# Patient Record
Sex: MALE | Race: WHITE | NOT HISPANIC OR LATINO | Employment: OTHER | URBAN - METROPOLITAN AREA
[De-identification: names, ages, dates, MRNs, and addresses within clinical notes are randomized per-mention and may not be internally consistent; named-entity substitution may affect disease eponyms.]

---

## 2023-11-06 ENCOUNTER — HOSPITAL ENCOUNTER (INPATIENT)
Facility: HOSPITAL | Age: 88
LOS: 6 days | Discharge: NURSING, REHAB OR LONG-TERM CARE | DRG: 689 | End: 2023-11-12
Attending: EMERGENCY MEDICINE | Admitting: STUDENT IN AN ORGANIZED HEALTH CARE EDUCATION/TRAINING PROGRAM
Payer: MEDICARE

## 2023-11-06 ENCOUNTER — APPOINTMENT (OUTPATIENT)
Dept: RADIOLOGY | Facility: HOSPITAL | Age: 88
DRG: 689 | End: 2023-11-06
Payer: MEDICARE

## 2023-11-06 ENCOUNTER — APPOINTMENT (OUTPATIENT)
Dept: CARDIOLOGY | Facility: HOSPITAL | Age: 88
DRG: 689 | End: 2023-11-06
Payer: MEDICARE

## 2023-11-06 DIAGNOSIS — J18.9 PNEUMONIA OF LEFT LUNG DUE TO INFECTIOUS ORGANISM, UNSPECIFIED PART OF LUNG: Primary | ICD-10-CM

## 2023-11-06 DIAGNOSIS — N18.9 ACUTE RENAL FAILURE SUPERIMPOSED ON CHRONIC KIDNEY DISEASE, UNSPECIFIED ACUTE RENAL FAILURE TYPE, UNSPECIFIED CKD STAGE (CMS-HCC): ICD-10-CM

## 2023-11-06 DIAGNOSIS — N17.9 ACUTE RENAL FAILURE SUPERIMPOSED ON CHRONIC KIDNEY DISEASE, UNSPECIFIED ACUTE RENAL FAILURE TYPE, UNSPECIFIED CKD STAGE (CMS-HCC): ICD-10-CM

## 2023-11-06 DIAGNOSIS — M10.372 ACUTE GOUT DUE TO RENAL IMPAIRMENT INVOLVING LEFT ANKLE: ICD-10-CM

## 2023-11-06 DIAGNOSIS — N39.0 ACUTE UTI: ICD-10-CM

## 2023-11-06 LAB
ALBUMIN SERPL BCP-MCNC: 4.1 G/DL (ref 3.4–5)
ALP SERPL-CCNC: 90 U/L (ref 33–136)
ALT SERPL W P-5'-P-CCNC: 23 U/L (ref 10–52)
ANION GAP SERPL CALC-SCNC: 20 MMOL/L (ref 10–20)
APPEARANCE UR: ABNORMAL
AST SERPL W P-5'-P-CCNC: 29 U/L (ref 9–39)
BASOPHILS # BLD MANUAL: 0 X10*3/UL (ref 0–0.1)
BASOPHILS NFR BLD MANUAL: 0 %
BILIRUB SERPL-MCNC: 2 MG/DL (ref 0–1.2)
BILIRUB UR STRIP.AUTO-MCNC: NEGATIVE MG/DL
BNP SERPL-MCNC: 148 PG/ML (ref 0–99)
BUN SERPL-MCNC: 48 MG/DL (ref 6–23)
CALCIUM SERPL-MCNC: 9.9 MG/DL (ref 8.6–10.3)
CARDIAC TROPONIN I PNL SERPL HS: 15 NG/L (ref 0–20)
CHLORIDE SERPL-SCNC: 99 MMOL/L (ref 98–107)
CO2 SERPL-SCNC: 22 MMOL/L (ref 21–32)
COLOR UR: YELLOW
CREAT SERPL-MCNC: 2.23 MG/DL (ref 0.5–1.3)
EOSINOPHIL # BLD MANUAL: 0 X10*3/UL (ref 0–0.4)
EOSINOPHIL NFR BLD MANUAL: 0 %
ERYTHROCYTE [DISTWIDTH] IN BLOOD BY AUTOMATED COUNT: 13.8 % (ref 11.5–14.5)
GFR SERPL CREATININE-BSD FRML MDRD: 28 ML/MIN/1.73M*2
GLUCOSE SERPL-MCNC: 122 MG/DL (ref 74–99)
GLUCOSE UR STRIP.AUTO-MCNC: ABNORMAL MG/DL
HCT VFR BLD AUTO: 40.4 % (ref 41–52)
HGB BLD-MCNC: 13 G/DL (ref 13.5–17.5)
HOLD SPECIMEN: NORMAL
HYALINE CASTS #/AREA URNS AUTO: ABNORMAL /LPF
IMM GRANULOCYTES # BLD AUTO: 0.04 X10*3/UL (ref 0–0.5)
IMM GRANULOCYTES NFR BLD AUTO: 0.4 % (ref 0–0.9)
KETONES UR STRIP.AUTO-MCNC: ABNORMAL MG/DL
LACTATE SERPL-SCNC: 2 MMOL/L (ref 0.4–2)
LEUKOCYTE ESTERASE UR QL STRIP.AUTO: ABNORMAL
LYMPHOCYTES # BLD MANUAL: 2.42 X10*3/UL (ref 0.8–3)
LYMPHOCYTES NFR BLD MANUAL: 22 %
MCH RBC QN AUTO: 31.9 PG (ref 26–34)
MCHC RBC AUTO-ENTMCNC: 32.2 G/DL (ref 32–36)
MCV RBC AUTO: 99 FL (ref 80–100)
MONOCYTES # BLD MANUAL: 1.1 X10*3/UL (ref 0.05–0.8)
MONOCYTES NFR BLD MANUAL: 10 %
MUCOUS THREADS #/AREA URNS AUTO: ABNORMAL /LPF
NEUTROPHILS # BLD MANUAL: 7.48 X10*3/UL (ref 1.6–5.5)
NEUTS BAND # BLD MANUAL: 0.88 X10*3/UL (ref 0–0.5)
NEUTS BAND NFR BLD MANUAL: 8 %
NEUTS SEG # BLD MANUAL: 6.6 X10*3/UL (ref 1.6–5)
NEUTS SEG NFR BLD MANUAL: 60 %
NITRITE UR QL STRIP.AUTO: NEGATIVE
NRBC BLD-RTO: 0 /100 WBCS (ref 0–0)
PH UR STRIP.AUTO: 5 [PH]
PLATELET # BLD AUTO: 272 X10*3/UL (ref 150–450)
POTASSIUM SERPL-SCNC: 4 MMOL/L (ref 3.5–5.3)
PROT SERPL-MCNC: 8.3 G/DL (ref 6.4–8.2)
PROT UR STRIP.AUTO-MCNC: ABNORMAL MG/DL
RBC # BLD AUTO: 4.07 X10*6/UL (ref 4.5–5.9)
RBC # UR STRIP.AUTO: ABNORMAL /UL
RBC #/AREA URNS AUTO: ABNORMAL /HPF
RBC MORPH BLD: ABNORMAL
SARS-COV-2 RNA RESP QL NAA+PROBE: NOT DETECTED
SODIUM SERPL-SCNC: 137 MMOL/L (ref 136–145)
SP GR UR STRIP.AUTO: 1.02
SQUAMOUS #/AREA URNS AUTO: ABNORMAL /HPF
TOTAL CELLS COUNTED BLD: 100
URATE SERPL-MCNC: 11.1 MG/DL (ref 4–7.5)
UROBILINOGEN UR STRIP.AUTO-MCNC: 4 MG/DL
WBC # BLD AUTO: 11 X10*3/UL (ref 4.4–11.3)
WBC #/AREA URNS AUTO: ABNORMAL /HPF

## 2023-11-06 PROCEDURE — 2500000001 HC RX 250 WO HCPCS SELF ADMINISTERED DRUGS (ALT 637 FOR MEDICARE OP): Performed by: STUDENT IN AN ORGANIZED HEALTH CARE EDUCATION/TRAINING PROGRAM

## 2023-11-06 PROCEDURE — 1210000001 HC SEMI-PRIVATE ROOM DAILY

## 2023-11-06 PROCEDURE — 87086 URINE CULTURE/COLONY COUNT: CPT | Mod: ELYLAB | Performed by: EMERGENCY MEDICINE

## 2023-11-06 PROCEDURE — 85027 COMPLETE CBC AUTOMATED: CPT | Performed by: EMERGENCY MEDICINE

## 2023-11-06 PROCEDURE — 81001 URINALYSIS AUTO W/SCOPE: CPT | Performed by: EMERGENCY MEDICINE

## 2023-11-06 PROCEDURE — 83880 ASSAY OF NATRIURETIC PEPTIDE: CPT | Performed by: STUDENT IN AN ORGANIZED HEALTH CARE EDUCATION/TRAINING PROGRAM

## 2023-11-06 PROCEDURE — 96365 THER/PROPH/DIAG IV INF INIT: CPT

## 2023-11-06 PROCEDURE — 71045 X-RAY EXAM CHEST 1 VIEW: CPT | Performed by: RADIOLOGY

## 2023-11-06 PROCEDURE — 87075 CULTR BACTERIA EXCEPT BLOOD: CPT | Mod: ELYLAB | Performed by: EMERGENCY MEDICINE

## 2023-11-06 PROCEDURE — 99285 EMERGENCY DEPT VISIT HI MDM: CPT | Mod: 25 | Performed by: EMERGENCY MEDICINE

## 2023-11-06 PROCEDURE — 71045 X-RAY EXAM CHEST 1 VIEW: CPT | Mod: FY

## 2023-11-06 PROCEDURE — 80053 COMPREHEN METABOLIC PANEL: CPT | Performed by: EMERGENCY MEDICINE

## 2023-11-06 PROCEDURE — 93005 ELECTROCARDIOGRAM TRACING: CPT

## 2023-11-06 PROCEDURE — 87040 BLOOD CULTURE FOR BACTERIA: CPT | Mod: ELYLAB | Performed by: EMERGENCY MEDICINE

## 2023-11-06 PROCEDURE — 36415 COLL VENOUS BLD VENIPUNCTURE: CPT | Performed by: EMERGENCY MEDICINE

## 2023-11-06 PROCEDURE — 84550 ASSAY OF BLOOD/URIC ACID: CPT | Performed by: EMERGENCY MEDICINE

## 2023-11-06 PROCEDURE — 83605 ASSAY OF LACTIC ACID: CPT | Performed by: EMERGENCY MEDICINE

## 2023-11-06 PROCEDURE — 36415 COLL VENOUS BLD VENIPUNCTURE: CPT | Mod: ELYLAB | Performed by: EMERGENCY MEDICINE

## 2023-11-06 PROCEDURE — 87635 SARS-COV-2 COVID-19 AMP PRB: CPT | Performed by: EMERGENCY MEDICINE

## 2023-11-06 PROCEDURE — 85007 BL SMEAR W/DIFF WBC COUNT: CPT | Performed by: EMERGENCY MEDICINE

## 2023-11-06 PROCEDURE — 2500000004 HC RX 250 GENERAL PHARMACY W/ HCPCS (ALT 636 FOR OP/ED): Performed by: EMERGENCY MEDICINE

## 2023-11-06 PROCEDURE — 84484 ASSAY OF TROPONIN QUANT: CPT | Performed by: EMERGENCY MEDICINE

## 2023-11-06 PROCEDURE — 76770 US EXAM ABDO BACK WALL COMP: CPT | Performed by: RADIOLOGY

## 2023-11-06 PROCEDURE — 2500000004 HC RX 250 GENERAL PHARMACY W/ HCPCS (ALT 636 FOR OP/ED): Performed by: STUDENT IN AN ORGANIZED HEALTH CARE EDUCATION/TRAINING PROGRAM

## 2023-11-06 PROCEDURE — 99223 1ST HOSP IP/OBS HIGH 75: CPT | Performed by: STUDENT IN AN ORGANIZED HEALTH CARE EDUCATION/TRAINING PROGRAM

## 2023-11-06 PROCEDURE — 76770 US EXAM ABDO BACK WALL COMP: CPT

## 2023-11-06 PROCEDURE — 2500000001 HC RX 250 WO HCPCS SELF ADMINISTERED DRUGS (ALT 637 FOR MEDICARE OP): Performed by: EMERGENCY MEDICINE

## 2023-11-06 RX ORDER — NITROGLYCERIN 0.4 MG/1
0.4 TABLET SUBLINGUAL EVERY 5 MIN PRN
Status: DISCONTINUED | OUTPATIENT
Start: 2023-11-06 | End: 2023-11-12 | Stop reason: HOSPADM

## 2023-11-06 RX ORDER — CHOLECALCIFEROL (VITAMIN D3) 25 MCG
1 TABLET ORAL DAILY
COMMUNITY
Start: 2020-01-17

## 2023-11-06 RX ORDER — SODIUM CHLORIDE, SODIUM LACTATE, POTASSIUM CHLORIDE, CALCIUM CHLORIDE 600; 310; 30; 20 MG/100ML; MG/100ML; MG/100ML; MG/100ML
75 INJECTION, SOLUTION INTRAVENOUS CONTINUOUS
Status: DISCONTINUED | OUTPATIENT
Start: 2023-11-06 | End: 2023-11-08

## 2023-11-06 RX ORDER — SPIRONOLACTONE 25 MG/1
25 TABLET ORAL DAILY
Status: DISCONTINUED | OUTPATIENT
Start: 2023-11-06 | End: 2023-11-12 | Stop reason: HOSPADM

## 2023-11-06 RX ORDER — COLCHICINE 0.6 MG/1
0.6 TABLET ORAL ONCE
Status: COMPLETED | OUTPATIENT
Start: 2023-11-06 | End: 2023-11-06

## 2023-11-06 RX ORDER — LOSARTAN POTASSIUM 25 MG/1
12.5 TABLET ORAL DAILY
Status: DISCONTINUED | OUTPATIENT
Start: 2023-11-06 | End: 2023-11-12 | Stop reason: HOSPADM

## 2023-11-06 RX ORDER — ATORVASTATIN CALCIUM 20 MG/1
40 TABLET, FILM COATED ORAL NIGHTLY
Status: DISCONTINUED | OUTPATIENT
Start: 2023-11-06 | End: 2023-11-12 | Stop reason: HOSPADM

## 2023-11-06 RX ORDER — CEFTRIAXONE 1 G/50ML
1 INJECTION, SOLUTION INTRAVENOUS ONCE
Status: COMPLETED | OUTPATIENT
Start: 2023-11-06 | End: 2023-11-06

## 2023-11-06 RX ORDER — SODIUM CHLORIDE 9 MG/ML
75 INJECTION, SOLUTION INTRAVENOUS CONTINUOUS
Status: DISCONTINUED | OUTPATIENT
Start: 2023-11-06 | End: 2023-11-06

## 2023-11-06 RX ORDER — NITROGLYCERIN 0.4 MG/1
0.4 TABLET SUBLINGUAL EVERY 5 MIN PRN
COMMUNITY

## 2023-11-06 RX ORDER — LEVOTHYROXINE SODIUM 75 UG/1
75 TABLET ORAL
COMMUNITY
Start: 2020-01-17

## 2023-11-06 RX ORDER — SENNOSIDES 8.6 MG/1
2 TABLET ORAL 2 TIMES DAILY
Status: DISCONTINUED | OUTPATIENT
Start: 2023-11-06 | End: 2023-11-12 | Stop reason: HOSPADM

## 2023-11-06 RX ORDER — METOPROLOL SUCCINATE 50 MG/1
50 TABLET, EXTENDED RELEASE ORAL DAILY
Status: DISCONTINUED | OUTPATIENT
Start: 2023-11-06 | End: 2023-11-12 | Stop reason: HOSPADM

## 2023-11-06 RX ORDER — PREDNISONE 20 MG/1
20 TABLET ORAL DAILY
Status: DISCONTINUED | OUTPATIENT
Start: 2023-11-11 | End: 2023-11-09

## 2023-11-06 RX ORDER — LOSARTAN POTASSIUM 25 MG/1
0.5 TABLET ORAL DAILY
COMMUNITY
Start: 2023-03-22

## 2023-11-06 RX ORDER — FUROSEMIDE 40 MG/1
40 TABLET ORAL DAILY PRN
Status: DISCONTINUED | OUTPATIENT
Start: 2023-11-06 | End: 2023-11-12 | Stop reason: HOSPADM

## 2023-11-06 RX ORDER — PREDNISONE 10 MG/1
10 TABLET ORAL DAILY
Status: DISCONTINUED | OUTPATIENT
Start: 2023-11-14 | End: 2023-11-09

## 2023-11-06 RX ORDER — TRAMADOL HYDROCHLORIDE 50 MG/1
50 TABLET ORAL EVERY 12 HOURS PRN
Status: DISCONTINUED | OUTPATIENT
Start: 2023-11-06 | End: 2023-11-12 | Stop reason: HOSPADM

## 2023-11-06 RX ORDER — PREDNISONE 10 MG/1
5 TABLET ORAL DAILY
Status: DISCONTINUED | OUTPATIENT
Start: 2023-11-17 | End: 2023-11-09

## 2023-11-06 RX ORDER — METOPROLOL SUCCINATE 50 MG/1
50 TABLET, EXTENDED RELEASE ORAL DAILY
COMMUNITY
Start: 2023-03-22

## 2023-11-06 RX ORDER — GUAIFENESIN 600 MG/1
600 TABLET, EXTENDED RELEASE ORAL 2 TIMES DAILY PRN
Status: DISCONTINUED | OUTPATIENT
Start: 2023-11-06 | End: 2023-11-12 | Stop reason: HOSPADM

## 2023-11-06 RX ORDER — CEFTRIAXONE 1 G/50ML
1 INJECTION, SOLUTION INTRAVENOUS EVERY 24 HOURS
Status: DISCONTINUED | OUTPATIENT
Start: 2023-11-07 | End: 2023-11-08

## 2023-11-06 RX ORDER — LEVOTHYROXINE SODIUM 75 UG/1
75 TABLET ORAL
Status: DISCONTINUED | OUTPATIENT
Start: 2023-11-07 | End: 2023-11-12 | Stop reason: HOSPADM

## 2023-11-06 RX ORDER — ATORVASTATIN CALCIUM 40 MG/1
40 TABLET, FILM COATED ORAL NIGHTLY
COMMUNITY
Start: 2020-01-17

## 2023-11-06 RX ORDER — PREDNISONE 20 MG/1
40 TABLET ORAL DAILY
Status: DISCONTINUED | OUTPATIENT
Start: 2023-11-06 | End: 2023-11-09

## 2023-11-06 RX ORDER — SPIRONOLACTONE 25 MG/1
25 TABLET ORAL DAILY
COMMUNITY
Start: 2023-05-17

## 2023-11-06 RX ORDER — FUROSEMIDE 40 MG/1
40 TABLET ORAL DAILY PRN
COMMUNITY
Start: 2023-10-02

## 2023-11-06 RX ADMIN — METOPROLOL SUCCINATE 50 MG: 50 TABLET, EXTENDED RELEASE ORAL at 21:55

## 2023-11-06 RX ADMIN — PREDNISONE 40 MG: 20 TABLET ORAL at 18:49

## 2023-11-06 RX ADMIN — SODIUM CHLORIDE, POTASSIUM CHLORIDE, SODIUM LACTATE AND CALCIUM CHLORIDE 75 ML/HR: 600; 310; 30; 20 INJECTION, SOLUTION INTRAVENOUS at 18:49

## 2023-11-06 RX ADMIN — APIXABAN 2.5 MG: 5 TABLET, FILM COATED ORAL at 18:50

## 2023-11-06 RX ADMIN — TRAMADOL HYDROCHLORIDE 50 MG: 50 TABLET, COATED ORAL at 21:57

## 2023-11-06 RX ADMIN — CEFTRIAXONE SODIUM 1 G: 1 INJECTION, SOLUTION INTRAVENOUS at 12:54

## 2023-11-06 RX ADMIN — STANDARDIZED SENNA CONCENTRATE 17.2 MG: 8.6 TABLET ORAL at 21:55

## 2023-11-06 RX ADMIN — EMPAGLIFLOZIN 12.5 MG: 25 TABLET, FILM COATED ORAL at 18:50

## 2023-11-06 RX ADMIN — ATORVASTATIN CALCIUM 40 MG: 20 TABLET, FILM COATED ORAL at 21:55

## 2023-11-06 RX ADMIN — AZITHROMYCIN MONOHYDRATE 500 MG: 500 INJECTION, POWDER, LYOPHILIZED, FOR SOLUTION INTRAVENOUS at 21:55

## 2023-11-06 RX ADMIN — COLCHICINE 0.6 MG: 0.6 TABLET ORAL at 13:22

## 2023-11-06 RX ADMIN — SODIUM CHLORIDE 75 ML/HR: 9 INJECTION, SOLUTION INTRAVENOUS at 13:00

## 2023-11-06 SDOH — SOCIAL STABILITY: SOCIAL INSECURITY: HAVE YOU HAD THOUGHTS OF HARMING ANYONE ELSE?: NO

## 2023-11-06 SDOH — SOCIAL STABILITY: SOCIAL INSECURITY: WERE YOU ABLE TO COMPLETE ALL THE BEHAVIORAL HEALTH SCREENINGS?: YES

## 2023-11-06 ASSESSMENT — COGNITIVE AND FUNCTIONAL STATUS - GENERAL
CLIMB 3 TO 5 STEPS WITH RAILING: A LITTLE
DAILY ACTIVITIY SCORE: 24
MOBILITY SCORE: 23
MOBILITY SCORE: 23
DAILY ACTIVITIY SCORE: 24
PATIENT BASELINE BEDBOUND: NO
CLIMB 3 TO 5 STEPS WITH RAILING: A LITTLE

## 2023-11-06 ASSESSMENT — ACTIVITIES OF DAILY LIVING (ADL)
GROOMING: INDEPENDENT
PATIENT'S MEMORY ADEQUATE TO SAFELY COMPLETE DAILY ACTIVITIES?: YES
BATHING: INDEPENDENT
JUDGMENT_ADEQUATE_SAFELY_COMPLETE_DAILY_ACTIVITIES: YES
HEARING - LEFT EAR: HEARING AID
DRESSING YOURSELF: INDEPENDENT
FEEDING YOURSELF: INDEPENDENT
WALKS IN HOME: INDEPENDENT
TOILETING: INDEPENDENT
HEARING - RIGHT EAR: HEARING AID
LACK_OF_TRANSPORTATION: NO
ADEQUATE_TO_COMPLETE_ADL: YES

## 2023-11-06 ASSESSMENT — PAIN SCALES - GENERAL
PAINLEVEL_OUTOF10: 3
PAINLEVEL_OUTOF10: 0 - NO PAIN
PAINLEVEL_OUTOF10: 8

## 2023-11-06 ASSESSMENT — LIFESTYLE VARIABLES
SKIP TO QUESTIONS 9-10: 1
AUDIT TOTAL SCORE: 0
HAVE PEOPLE ANNOYED YOU BY CRITICIZING YOUR DRINKING: NO
SUBSTANCE_ABUSE_PAST_12_MONTHS: NO
REASON UNABLE TO ASSESS: NO
AUDIT TOTAL SCORE: 3
HOW OFTEN DURING THE LAST YEAR HAVE YOU FAILED TO DO WHAT WAS NORMALLY EXPECTED FROM YOU BECAUSE OF DRINKING: NEVER
EVER HAD A DRINK FIRST THING IN THE MORNING TO STEADY YOUR NERVES TO GET RID OF A HANGOVER: NO
HOW MANY STANDARD DRINKS CONTAINING ALCOHOL DO YOU HAVE ON A TYPICAL DAY: 1 OR 2
HOW OFTEN DO YOU HAVE A DRINK CONTAINING ALCOHOL: 2-3 TIMES A WEEK
AUDIT-C TOTAL SCORE: 3
HOW OFTEN DURING THE LAST YEAR HAVE YOU FOUND THAT YOU WERE NOT ABLE TO STOP DRINKING ONCE YOU HAD STARTED: NEVER
HAVE YOU OR SOMEONE ELSE BEEN INJURED AS A RESULT OF YOUR DRINKING: NO
HOW OFTEN DURING THE LAST YEAR HAVE YOU BEEN UNABLE TO REMEMBER WHAT HAPPENED THE NIGHT BEFORE BECAUSE YOU HAD BEEN DRINKING: NEVER
EVER FELT BAD OR GUILTY ABOUT YOUR DRINKING: NO
AUDIT-C TOTAL SCORE: 3
HOW OFTEN DURING THE LAST YEAR HAVE YOU NEEDED AN ALCOHOLIC DRINK FIRST THING IN THE MORNING TO GET YOURSELF GOING AFTER A NIGHT OF HEAVY DRINKING: NEVER
HOW OFTEN DURING THE LAST YEAR HAVE YOU HAD A FEELING OF GUILT OR REMORSE AFTER DRINKING: NEVER
PRESCIPTION_ABUSE_PAST_12_MONTHS: NO
HAS A RELATIVE, FRIEND, DOCTOR, OR ANOTHER HEALTH PROFESSIONAL EXPRESSED CONCERN ABOUT YOUR DRINKING OR SUGGESTED YOU CUT DOWN: NO
HOW OFTEN DO YOU HAVE 6 OR MORE DRINKS ON ONE OCCASION: NEVER
HAVE YOU EVER FELT YOU SHOULD CUT DOWN ON YOUR DRINKING: NO

## 2023-11-06 ASSESSMENT — PATIENT HEALTH QUESTIONNAIRE - PHQ9
1. LITTLE INTEREST OR PLEASURE IN DOING THINGS: NOT AT ALL
SUM OF ALL RESPONSES TO PHQ9 QUESTIONS 1 & 2: 0
2. FEELING DOWN, DEPRESSED OR HOPELESS: NOT AT ALL

## 2023-11-06 ASSESSMENT — PAIN - FUNCTIONAL ASSESSMENT
PAIN_FUNCTIONAL_ASSESSMENT: 0-10
PAIN_FUNCTIONAL_ASSESSMENT: 0-10

## 2023-11-06 ASSESSMENT — PAIN DESCRIPTION - PROGRESSION: CLINICAL_PROGRESSION: NOT CHANGED

## 2023-11-06 ASSESSMENT — PAIN DESCRIPTION - LOCATION: LOCATION: LEG

## 2023-11-06 ASSESSMENT — COLUMBIA-SUICIDE SEVERITY RATING SCALE - C-SSRS
6. HAVE YOU EVER DONE ANYTHING, STARTED TO DO ANYTHING, OR PREPARED TO DO ANYTHING TO END YOUR LIFE?: NO
2. HAVE YOU ACTUALLY HAD ANY THOUGHTS OF KILLING YOURSELF?: NO
1. IN THE PAST MONTH, HAVE YOU WISHED YOU WERE DEAD OR WISHED YOU COULD GO TO SLEEP AND NOT WAKE UP?: NO

## 2023-11-06 ASSESSMENT — PAIN DESCRIPTION - ORIENTATION: ORIENTATION: LEFT;RIGHT

## 2023-11-06 NOTE — PROGRESS NOTES
Pharmacy Medication History Review    Marcelino Oropeza is a 88 y.o. male admitted for Pneumonia of left lung due to infectious organism, unspecified part of lung. Pharmacy reviewed the patient's ylegf-oe-ycjdsnigc medications and allergies for accuracy.    The list below reflectives the updated PTA list. Please review each medication in order reconciliation for additional clarification and justification.  (Not in a hospital admission)       The list below reflectives the updated allergy list. Please review each documented allergy for additional clarification and justification.  Allergies  Reviewed by Roz Verduzco RN on 11/6/2023   No Known Allergies         Below are additional concerns with the patient's PTA list.  Alerts for Empagliflozin & Losartan - Approval from [facility] & [facility]    Heather Watson CPhT

## 2023-11-06 NOTE — H&P
Medical Group History and Physical    ASSESSMENT & PLAN:     Gram Negative Bacterial Pneumonia  -Patient with productive purulent sputum, hoarse cough, and imaging findings consistent with pneumonia at time of presentation.    Plan:  -We will treat with ceftriaxone/azithromycin and monitor for improvement  -We will send procalcitonin   -Strep urine/Legionella antigen    Acute Gout Flare  -Presenting with b/l LE pain/swelling.  Most significant in left ankle.  Given renal function will treat with prednisone taper and monitor.    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 Unclear etiology. Will send renal lytes for FeUrea/obtain Renal U/s for further evaluation    UTI  -On ATB as per above, will follow cx    HFrEF  -Appears compensated. Most recent echo reviewed, revealed EF of 25-30%. BNP indeterminate and patient does not appear grossly overloaded.  Will monitor    Hx Afib, CAD, HTN, HLD, Arthritis  -Home meds resumed as applicable    DVT Ppx:  -On Jose Norwood MD    HISTORY OF PRESENT ILLNESS:   Chief Complaint: Leg pain    History Of Present Illness:    Marcelino Oropeza is a 88 y.o. male with a significant past medical history of including gout, HFrEF, and A-fib who presents to hospital due to concerns of leg pain.  Patient is a poor historian however reports that he began to experience pain in his lower extremities for the past few days.  Localizes most to his left ankle.  Notes that it is tender with touch and movement.  Reportedly unsteady on his feet, states he felt like his right ankle was about to give out.  Does have a history of gout, unsure if this is like prior flares in the past.  Does report intake of 1-2 vodka drinks at night in recent history.  Denying any fevers chills or chest pain.  Is endorsing cough with discolored sputum production.  States has been ongoing also for the past few days.  Denies difficulty breathing.  Endorsing constipation but denies any  abdominal pain or diarrhea.  Further ROS is unremarkable..     Past Medical History:  - CAD, Ischemic cardiomyopathy, HTN, Systolic heart failure, A-fib, Hyperlipidemia, Gout, arthritis     Past Surgical History:  -CABG x4,    Family History:  -Denies family history including hypertension/diabetes    Social History:  Tobacco: Quit 60 years ago  Alcohol: Drinks 1-2 vodka drinks a night  Other Drugs: Denies use  Home Situation: Lives by himself.  Ambulates with cane.       Review of Systems:  10 point review of systems performed, pertinent positives as per the HPI otherwise negative.     PAST HISTORIES:       Past Medical History:  He has a past medical history of Gout and Hypertension.    Past Surgical History:  He has a past surgical history that includes Other surgical history (04/14/2017); Aortic valve replacement (04/14/2017); Other surgical history (04/14/2017); and Coronary artery bypass graft.      Social History:  He has no history on file for tobacco use, alcohol use, and drug use.    Family History:  No family history on file.     Allergies:  Patient has no known allergies.    OBJECTIVE:       Last Recorded Vitals:  Vitals:    11/06/23 1348 11/06/23 1349 11/06/23 1350 11/06/23 1533   BP:   121/58 136/71   BP Location:    Left arm   Patient Position:    Sitting   Pulse: 88 88  91   Resp:    18   Temp:       SpO2: 98% 98%  98%   Weight:       Height:           Last I/O:  No intake/output data recorded.    Physical Exam  General: Well-developed elderly male in mild distress  HEENT: Clear sclera, EOMI, trachea midline, moist mucous membranes  Respiratory: Equal chest rise, no retractions, diffuse rhonchi  Cardiovascular: S1 and S2 auscultated, no murmurs clicks or rubs  Abdomen: Soft, nontender, nondistended  Extremities: Bilateral lower extremity swelling, nonpitting, left ankle exquisitely tender to touch and ROM  Neurological: Spontaneously moves all extremities, no dysarthria, cranial nerves grossly  intact  Psychiatric: Appropriate mood and affect  Skin: Warm, dry    Scheduled Medications  predniSONE, 40 mg, oral, Daily   Followed by  [START ON 11/11/2023] predniSONE, 20 mg, oral, Daily   Followed by  [START ON 11/14/2023] predniSONE, 10 mg, oral, Daily   Followed by  [START ON 11/17/2023] predniSONE, 5 mg, oral, Daily      PRN Medications    Continuous Medications  sodium chloride 0.9%, 75 mL/hr, Last Rate: 75 mL/hr (11/06/23 1300)        Outpatient Medications:  Prior to Admission medications    Medication Sig Start Date End Date Taking? Authorizing Provider   apixaban (Eliquis) 2.5 mg tablet Take 1 tablet (2.5 mg) by mouth every 12 hours. 8/24/23  Yes Historical Provider, MD   atorvastatin (Lipitor) 40 mg tablet Take 1 tablet (40 mg) by mouth once daily at bedtime. 1/17/20  Yes Historical Provider, MD   cholecalciferol (Vitamin D-3) 25 MCG (1000 UT) tablet Take 1 tablet (25 mcg) by mouth once daily. 1/17/20  Yes Historical Provider, MD   empagliflozin (Jardiance) 25 mg Take 0.5 tablets (12.5 mg) by mouth once daily. 10/2/23  Yes Historical Provider, MD   furosemide (Lasix) 40 mg tablet Take 1 tablet (40 mg) by mouth once daily as needed (Fluid Retention). 10/2/23  Yes Historical Provider, MD   levothyroxine (Synthroid, Levoxyl) 75 mcg tablet Take 1 tablet (75 mcg) by mouth once daily in the morning. Take before meals. 1/17/20  Yes Historical Provider, MD   losartan (Cozaar) 25 mg tablet Take 0.5 tablets (12.5 mg) by mouth once daily. 3/22/23  Yes Historical Provider, MD   metoprolol succinate XL (Toprol-XL) 50 mg 24 hr tablet Take 1 tablet (50 mg) by mouth once daily. 3/22/23  Yes Historical Provider, MD   spironolactone (Aldactone) 25 mg tablet Take 1 tablet (25 mg) by mouth once daily. 5/17/23  Yes Historical Provider, MD   nitroglycerin (Nitrostat) 0.4 mg SL tablet Place 1 tablet (0.4 mg) under the tongue every 5 minutes if needed for chest pain (up to 3 doses).    Historical Provider, MD       LABS AND  IMAGING:     Labs:  Results from last 7 days   Lab Units 11/06/23  1113   WBC AUTO x10*3/uL 11.0   RBC AUTO x10*6/uL 4.07*   HEMOGLOBIN g/dL 13.0*   HEMATOCRIT % 40.4*   MCV fL 99   MCH pg 31.9   MCHC g/dL 32.2   RDW % 13.8   PLATELETS AUTO x10*3/uL 272     Results from last 7 days   Lab Units 11/06/23  1113   SODIUM mmol/L 137   POTASSIUM mmol/L 4.0   CHLORIDE mmol/L 99   CO2 mmol/L 22   BUN mg/dL 48*   CREATININE mg/dL 2.23*   GLUCOSE mg/dL 122*   PROTEIN TOTAL g/dL 8.3*   CALCIUM mg/dL 9.9   BILIRUBIN TOTAL mg/dL 2.0*   ALK PHOS U/L 90   AST U/L 29   ALT U/L 23         Results from last 7 days   Lab Units 11/06/23  1113   TROPHS ng/L 15       Imaging:  XR chest 1 view  Narrative: Interpreted By:  Franck Kirk,   STUDY:  XR CHEST 1 VIEW  11/6/2023 12:39 pm      INDICATION:  Signs/Symptoms:cough      COMPARISON:  07/19/2022      ACCESSION NUMBER(S):  [id number]      ORDERING CLINICIAN:  SUKH HOWELL      TECHNIQUE:  A single AP portable radiograph of the chest was obtained.      FINDINGS:  Multiple cardiac monitoring leads are seen over the chest.  Sternal  wires and mediastinal surgical clips are present. Left basilar  airspace consolidation is seen and may represent small pleural  effusion, atelectasis and/or pneumonia. No pneumothorax is  identified. The cardiac silhouette is within normal limits for size.      Impression: Left-sided airspace consolidation, as above. Clinical correlation and  continued follow-up until clearing is recommended.      MACRO:  None.      Signed by: Franck Kirk 11/6/2023 12:49 PM  Dictation workstation:   [id number]

## 2023-11-06 NOTE — ED PROVIDER NOTES
HPI   Chief Complaint   Patient presents with   • Cough       Patient is a pleasant 88-year-old male with history of CABG gout hypertension aortic valve replacement on Eliquis comes in complaining of cough, that started on Wednesday and by Friday it was getting worse with productive sputum feeling really weak tired and not having any energy also complaining of pain in the right ankle and swelling and now pain in the left foot and ankle hurts to walk.                        No data recorded                Patient History   No past medical history on file.  Past Surgical History:   Procedure Laterality Date   • AORTIC VALVE REPLACEMENT  04/14/2017    Aortic Valve Replacement   • OTHER SURGICAL HISTORY  04/14/2017    CABG Three Or More Nonautogenous Grafts   • OTHER SURGICAL HISTORY  04/14/2017    Mitral Valve Repair     No family history on file.  Social History     Tobacco Use   • Smoking status: Not on file   • Smokeless tobacco: Not on file   Substance Use Topics   • Alcohol use: Not on file   • Drug use: Not on file       Physical Exam   ED Triage Vitals [11/06/23 1051]   Temp Heart Rate Resp BP   36.5 °C (97.7 °F) 87 20 144/85      SpO2 Temp src Heart Rate Source Patient Position   98 % -- Monitor --      BP Location FiO2 (%)     -- --       Physical Exam  Vitals and nursing note reviewed.   Constitutional:       Appearance: He is ill-appearing.   HENT:      Head: Normocephalic.   Eyes:      Pupils: Pupils are equal, round, and reactive to light.   Cardiovascular:      Rate and Rhythm: Normal rate and regular rhythm.   Pulmonary:      Effort: Pulmonary effort is normal.      Breath sounds: Wheezing and rhonchi present.   Abdominal:      Palpations: Abdomen is soft.   Musculoskeletal:         General: Swelling and tenderness present. Normal range of motion.      Cervical back: Normal range of motion.      Comments: Swelling and tenderness bilateral ankles with left ankle warm to touch and left first MTP joint  tender and warm to touch   Skin:     General: Skin is warm.   Neurological:      General: No focal deficit present.      Mental Status: He is alert and oriented to person, place, and time.       ED Course & MDM   Diagnoses as of 11/06/23 1340   Pneumonia of left lung due to infectious organism, unspecified part of lung   Acute UTI   Acute renal failure superimposed on chronic kidney disease, unspecified acute renal failure type, unspecified CKD stage (CMS/[facility])   Acute gout due to renal impairment involving left ankle       Medical Decision Making  My differential diagnosis  Acute pneumonia acute COVID acute sepsis acute gout  Ordered CBC chemistry COVID test chest x-ray and uric acid.  Further work-up and management be done based upon the results of the test ordered    Labs Reviewed  CBC WITH AUTO DIFFERENTIAL - Abnormal     WBC                           11.0                   nRBC                          0.0                    RBC                           4.07 (*)               Hemoglobin                    13.0 (*)               Hematocrit                    40.4 (*)               MCV                           99                     MCH                           31.9                   MCHC                          32.2                   RDW                           13.8                   Platelets                     272                    Immature Granulocytes %, Automated   0.4                    Immature Granulocytes Absolute, Au*   0.04                     Narrative: The previously reported component Neutrophils % is no longer being reported.The previously reported component Lymphocytes % is no longer being reported.The previously reported component Monocytes % is no longer being reported.The previously                 reported component Eosinophils % is no longer being reported.The previously reported component Basophils % is no longer being reported.The previously reported component Absolute Neutrophils  is no longer being reported.The previously reported                 component Absolute Lymphocytes is no longer being reported.The previously reported component Absolute Monocytes is no longer being reported.The previously reported component Absolute Eosinophils is no longer being reported.The previously reported                 component Absolute Basophils is no longer being reported.  COMPREHENSIVE METABOLIC PANEL - Abnormal     Glucose                       122 (*)                Sodium                        137                    Potassium                     4.0                    Chloride                      99                     Bicarbonate                   22                     Anion Gap                     20                     Urea Nitrogen                 48 (*)                 Creatinine                    2.23 (*)               eGFR                          28 (*)                 Calcium                       9.9                    Albumin                       4.1                    Alkaline Phosphatase          90                     Total Protein                 8.3 (*)                AST                           29                     Bilirubin, Total              2.0 (*)                ALT                           23                  URIC ACID - Abnormal     Uric Acid                     11.1 (*)            URINALYSIS WITH REFLEX MICROSCOPIC AND CULTURE - Abnormal     Color, Urine                  Yellow                 Appearance, Urine             Hazy (*)               Specific Gravity, Urine       1.017                  pH, Urine                     5.0                    Protein, Urine                30 (1+) (*)               Glucose, Urine                150 (2+) (*)               Blood, Urine                  SMALL (1+) (*)               Ketones, Urine                5 (TRACE) (*)               Bilirubin, Urine              NEGATIVE                Urobilinogen, Urine           4.0  (*)                Nitrite, Urine                NEGATIVE                Leukocyte Esterase, Urine     SMALL (1+) (*)            MICROSCOPIC ONLY, URINE - Abnormal     WBC, Urine                    11-20 (*)               RBC, Urine                    1-2                    Squamous Epithelial Cells, Urine                          Mucus, Urine                  1+                     Hyaline Casts, Urine          3+ (*)              MANUAL DIFFERENTIAL - Abnormal     Neutrophils %, Manual         60.0                   Bands %, Manual               8.0                    Lymphocytes %, Manual         22.0                   Monocytes %, Manual           10.0                   Eosinophils %, Manual         0.0                    Basophils %, Manual           0.0                    Seg Neutrophils Absolute, Manual   6.60 (*)               Bands Absolute, Manual        0.88 (*)               Lymphocytes Absolute, Manual   2.42                   Monocytes Absolute, Manual    1.10 (*)               Eosinophils Absolute, Manual   0.00                   Basophils Absolute, Manual    0.00                   Total Cells Counted           100                    Neutrophils Absolute, Manual   7.48 (*)               RBC Morphology                                    SARS-COV-2 PCR, SYMPTOMATIC - Normal     Coronavirus 2019, PCR                                  Narrative: This assay has received FDA Emergency Use Authorization (EUA) and is only authorized for the duration of time that circumstances exist to justify the authorization of the emergency use of in vitro diagnostic tests for the detection of SARS-CoV-2 virus and/or diagnosis of COVID-19 infection under section 564(b)(1) of the Act, 21 U.S.C. 360bbb-3(b)(1). This assay is an in vitro diagnostic nucleic acid amplification test for the qualitative detection of SARS-CoV-2 from nasopharyngeal specimens and has been validated for use at [facility].  Negative results do not preclude COVID-19 infections and should not be used as the sole basis for diagnosis, treatment, or other management decisions.                  LACTATE - Normal     Lactate                       2.0                      Narrative: Venipuncture immediately after or during the administration of Metamizole may lead to falsely low results. Testing should be performed immediately                prior to Metamizole dosing.  TROPONIN I, HIGH SENSITIVITY - Normal     Troponin I, High Sensitivity   15                       Narrative: Less than 99th percentile of normal range cutoff-                Female and children under 18 years old <14 ng/L; Male <21 ng/L: Negative                Repeat testing should be performed if clinically indicated.                                 Female and children under 18 years old 14-50 ng/L; Male 21-50 ng/L:                Consistent with possible cardiac damage and possible increased clinical                 risk. Serial measurements may help to assess extent of myocardial damage.                                 >50 ng/L: Consistent with cardiac damage, increased clinical risk and                myocardial infarction. Serial measurements may help assess extent of                 myocardial damage.                                  NOTE: Children less than 1 year old may have higher baseline troponin                 levels and results should be interpreted in conjunction with the overall                 clinical context.                                 NOTE: Troponin I testing is performed using a different                 testing methodology at [facility] than at other                 [facility]. Direct result comparisons should only                 be made within the same method.  BLOOD CULTURE  BLOOD CULTURE  URINE CULTURE  URINALYSIS WITH REFLEX MICROSCOPIC AND CULTURE       Narrative: The following orders were created for panel order Urinalysis  with Reflex Microscopic and Culture.                Procedure                               Abnormality         Status                                   ---------                               -----------         ------                                   Urinalysis with Reflex Mi...[[id number]]  Abnormal            Final result                             Extra Urine Gray Tube[[id number]]                             In process                                               Please view results for these tests on the individual orders.  EXTRA URINE GRAY TUBE     XR chest 1 view   Final Result    Left-sided airspace consolidation, as above. Clinical correlation and    continued follow-up until clearing is recommended.          MACRO:    None.          Signed by: Franck Kirk 11/6/2023 12:49 PM    Dictation workstation:   [id number]     My work-up done showed a infiltrate on the chest x-ray COVID was negative urinalysis did show some UTI along with worsening of his renal functions patient was started on IV fluids along with IV Rocephin and at this time will be admitted to the medical team.  Patient's uric acid was also high so there was gout exacerbation was given dose of colchicine.    Patient's repeat EKG done in the ED shows normal sinus rhythm rate of 88 left bundle branch block, nonspecific ST segments.  EKG was interpreted by me        Procedure  ECG 12 lead    Performed by: Lu Vogt MD  Authorized by: Lu Vogt MD    ECG reviewed by ED Physician in the absence of a cardiologist: yes    Rate:     ECG rate:  86  Rhythm:     Rhythm: sinus rhythm    QRS:     QRS conduction: LBBB    ST segments:     ST segments:  Non-specific       Lu Vogt MD  11/06/23 0228

## 2023-11-07 LAB
ANION GAP SERPL CALC-SCNC: 15 MMOL/L (ref 10–20)
BACTERIA UR CULT: NORMAL
BASOPHILS # BLD MANUAL: 0 X10*3/UL (ref 0–0.1)
BASOPHILS NFR BLD MANUAL: 0 %
BUN SERPL-MCNC: 53 MG/DL (ref 6–23)
BURR CELLS BLD QL SMEAR: ABNORMAL
CALCIUM SERPL-MCNC: 9 MG/DL (ref 8.6–10.3)
CHLORIDE SERPL-SCNC: 103 MMOL/L (ref 98–107)
CHLORIDE UR-SCNC: 19 MMOL/L
CHLORIDE/CREATININE (MMOL/G) IN URINE: 14 MMOL/G CREAT (ref 23–275)
CO2 SERPL-SCNC: 20 MMOL/L (ref 21–32)
CREAT SERPL-MCNC: 2.12 MG/DL (ref 0.5–1.3)
CREAT UR-MCNC: 133.9 MG/DL (ref 20–370)
CREAT UR-MCNC: 133.9 MG/DL (ref 20–370)
EOSINOPHIL # BLD MANUAL: 0 X10*3/UL (ref 0–0.4)
EOSINOPHIL NFR BLD MANUAL: 0 %
ERYTHROCYTE [DISTWIDTH] IN BLOOD BY AUTOMATED COUNT: 13.5 % (ref 11.5–14.5)
GFR SERPL CREATININE-BSD FRML MDRD: 29 ML/MIN/1.73M*2
GLUCOSE SERPL-MCNC: 142 MG/DL (ref 74–99)
HCT VFR BLD AUTO: 32 % (ref 41–52)
HGB BLD-MCNC: 10.5 G/DL (ref 13.5–17.5)
HOLD SPECIMEN: NORMAL
IMM GRANULOCYTES # BLD AUTO: 0.05 X10*3/UL (ref 0–0.5)
IMM GRANULOCYTES NFR BLD AUTO: 0.5 % (ref 0–0.9)
LEGIONELLA AG UR QL: NEGATIVE
LYMPHOCYTES # BLD MANUAL: 0.58 X10*3/UL (ref 0.8–3)
LYMPHOCYTES NFR BLD MANUAL: 6 %
MAGNESIUM SERPL-MCNC: 2.68 MG/DL (ref 1.6–2.4)
MCH RBC QN AUTO: 31.9 PG (ref 26–34)
MCHC RBC AUTO-ENTMCNC: 32.8 G/DL (ref 32–36)
MCV RBC AUTO: 97 FL (ref 80–100)
MONOCYTES # BLD MANUAL: 0.29 X10*3/UL (ref 0.05–0.8)
MONOCYTES NFR BLD MANUAL: 3 %
NEUTROPHILS # BLD MANUAL: 8.83 X10*3/UL (ref 1.6–5.5)
NEUTS BAND # BLD MANUAL: 0.97 X10*3/UL (ref 0–0.5)
NEUTS BAND NFR BLD MANUAL: 10 %
NEUTS SEG # BLD MANUAL: 7.86 X10*3/UL (ref 1.6–5)
NEUTS SEG NFR BLD MANUAL: 81 %
NRBC BLD-RTO: 0 /100 WBCS (ref 0–0)
PLATELET # BLD AUTO: 221 X10*3/UL (ref 150–450)
POTASSIUM SERPL-SCNC: 4.1 MMOL/L (ref 3.5–5.3)
POTASSIUM UR-SCNC: 47 MMOL/L
POTASSIUM/CREAT UR-RTO: 35 MMOL/G CREAT
PROCALCITONIN SERPL-MCNC: 0.85 NG/ML
RBC # BLD AUTO: 3.29 X10*6/UL (ref 4.5–5.9)
RBC MORPH BLD: ABNORMAL
S PNEUM AG UR QL: NEGATIVE
SODIUM SERPL-SCNC: 134 MMOL/L (ref 136–145)
SODIUM UR-SCNC: 36 MMOL/L
SODIUM/CREAT UR-RTO: 27 MMOL/G CREAT
TOTAL CELLS COUNTED BLD: 100
UREA/CREAT UR-SRTO: 8.8 G/G CREAT
UUN UR-MCNC: 1185 MG/DL
WBC # BLD AUTO: 9.7 X10*3/UL (ref 4.4–11.3)

## 2023-11-07 PROCEDURE — 87075 CULTR BACTERIA EXCEPT BLOOD: CPT | Mod: ELYLAB | Performed by: STUDENT IN AN ORGANIZED HEALTH CARE EDUCATION/TRAINING PROGRAM

## 2023-11-07 PROCEDURE — 84145 PROCALCITONIN (PCT): CPT | Mod: ELYLAB | Performed by: STUDENT IN AN ORGANIZED HEALTH CARE EDUCATION/TRAINING PROGRAM

## 2023-11-07 PROCEDURE — 97161 PT EVAL LOW COMPLEX 20 MIN: CPT | Mod: GP

## 2023-11-07 PROCEDURE — 82570 ASSAY OF URINE CREATININE: CPT | Performed by: STUDENT IN AN ORGANIZED HEALTH CARE EDUCATION/TRAINING PROGRAM

## 2023-11-07 PROCEDURE — 87899 AGENT NOS ASSAY W/OPTIC: CPT | Mod: ELYLAB | Performed by: STUDENT IN AN ORGANIZED HEALTH CARE EDUCATION/TRAINING PROGRAM

## 2023-11-07 PROCEDURE — 1210000001 HC SEMI-PRIVATE ROOM DAILY

## 2023-11-07 PROCEDURE — 2500000004 HC RX 250 GENERAL PHARMACY W/ HCPCS (ALT 636 FOR OP/ED): Performed by: STUDENT IN AN ORGANIZED HEALTH CARE EDUCATION/TRAINING PROGRAM

## 2023-11-07 PROCEDURE — 83735 ASSAY OF MAGNESIUM: CPT | Performed by: STUDENT IN AN ORGANIZED HEALTH CARE EDUCATION/TRAINING PROGRAM

## 2023-11-07 PROCEDURE — 99233 SBSQ HOSP IP/OBS HIGH 50: CPT | Performed by: STUDENT IN AN ORGANIZED HEALTH CARE EDUCATION/TRAINING PROGRAM

## 2023-11-07 PROCEDURE — 80048 BASIC METABOLIC PNL TOTAL CA: CPT | Performed by: STUDENT IN AN ORGANIZED HEALTH CARE EDUCATION/TRAINING PROGRAM

## 2023-11-07 PROCEDURE — 84540 ASSAY OF URINE/UREA-N: CPT | Performed by: STUDENT IN AN ORGANIZED HEALTH CARE EDUCATION/TRAINING PROGRAM

## 2023-11-07 PROCEDURE — 94760 N-INVAS EAR/PLS OXIMETRY 1: CPT

## 2023-11-07 PROCEDURE — 87449 NOS EACH ORGANISM AG IA: CPT | Mod: ELYLAB | Performed by: STUDENT IN AN ORGANIZED HEALTH CARE EDUCATION/TRAINING PROGRAM

## 2023-11-07 PROCEDURE — 2500000001 HC RX 250 WO HCPCS SELF ADMINISTERED DRUGS (ALT 637 FOR MEDICARE OP): Performed by: STUDENT IN AN ORGANIZED HEALTH CARE EDUCATION/TRAINING PROGRAM

## 2023-11-07 PROCEDURE — 85007 BL SMEAR W/DIFF WBC COUNT: CPT | Performed by: STUDENT IN AN ORGANIZED HEALTH CARE EDUCATION/TRAINING PROGRAM

## 2023-11-07 PROCEDURE — 85027 COMPLETE CBC AUTOMATED: CPT | Performed by: STUDENT IN AN ORGANIZED HEALTH CARE EDUCATION/TRAINING PROGRAM

## 2023-11-07 PROCEDURE — 36415 COLL VENOUS BLD VENIPUNCTURE: CPT | Performed by: STUDENT IN AN ORGANIZED HEALTH CARE EDUCATION/TRAINING PROGRAM

## 2023-11-07 PROCEDURE — 97165 OT EVAL LOW COMPLEX 30 MIN: CPT | Mod: GO

## 2023-11-07 RX ADMIN — CEFTRIAXONE SODIUM 1 G: 1 INJECTION, SOLUTION INTRAVENOUS at 00:26

## 2023-11-07 RX ADMIN — METOPROLOL SUCCINATE 50 MG: 50 TABLET, EXTENDED RELEASE ORAL at 21:00

## 2023-11-07 RX ADMIN — STANDARDIZED SENNA CONCENTRATE 17.2 MG: 8.6 TABLET ORAL at 09:57

## 2023-11-07 RX ADMIN — ATORVASTATIN CALCIUM 40 MG: 20 TABLET, FILM COATED ORAL at 21:00

## 2023-11-07 RX ADMIN — APIXABAN 2.5 MG: 5 TABLET, FILM COATED ORAL at 21:00

## 2023-11-07 RX ADMIN — LEVOTHYROXINE SODIUM 75 MCG: 75 TABLET ORAL at 06:02

## 2023-11-07 RX ADMIN — AZITHROMYCIN MONOHYDRATE 500 MG: 500 INJECTION, POWDER, LYOPHILIZED, FOR SOLUTION INTRAVENOUS at 09:57

## 2023-11-07 RX ADMIN — APIXABAN 2.5 MG: 5 TABLET, FILM COATED ORAL at 06:01

## 2023-11-07 RX ADMIN — SODIUM CHLORIDE, POTASSIUM CHLORIDE, SODIUM LACTATE AND CALCIUM CHLORIDE 75 ML/HR: 600; 310; 30; 20 INJECTION, SOLUTION INTRAVENOUS at 09:57

## 2023-11-07 RX ADMIN — STANDARDIZED SENNA CONCENTRATE 17.2 MG: 8.6 TABLET ORAL at 21:00

## 2023-11-07 RX ADMIN — EMPAGLIFLOZIN 12.5 MG: 25 TABLET, FILM COATED ORAL at 09:57

## 2023-11-07 RX ADMIN — PREDNISONE 40 MG: 20 TABLET ORAL at 09:57

## 2023-11-07 ASSESSMENT — COGNITIVE AND FUNCTIONAL STATUS - GENERAL
DRESSING REGULAR UPPER BODY CLOTHING: A LITTLE
MOBILITY SCORE: 11
MOVING FROM LYING ON BACK TO SITTING ON SIDE OF FLAT BED WITH BEDRAILS: A LOT
MOBILITY SCORE: 11
TURNING FROM BACK TO SIDE WHILE IN FLAT BAD: A LOT
DRESSING REGULAR UPPER BODY CLOTHING: A LITTLE
TOILETING: A LOT
MOVING FROM LYING ON BACK TO SITTING ON SIDE OF FLAT BED WITH BEDRAILS: A LITTLE
MOVING TO AND FROM BED TO CHAIR: A LOT
MOVING TO AND FROM BED TO CHAIR: A LOT
TURNING FROM BACK TO SIDE WHILE IN FLAT BAD: A LOT
HELP NEEDED FOR BATHING: A LOT
MOBILITY SCORE: 11
DRESSING REGULAR UPPER BODY CLOTHING: A LITTLE
PERSONAL GROOMING: A LOT
DRESSING REGULAR LOWER BODY CLOTHING: TOTAL
TURNING FROM BACK TO SIDE WHILE IN FLAT BAD: A LOT
MOVING FROM LYING ON BACK TO SITTING ON SIDE OF FLAT BED WITH BEDRAILS: A LOT
WALKING IN HOSPITAL ROOM: TOTAL
STANDING UP FROM CHAIR USING ARMS: A LOT
STANDING UP FROM CHAIR USING ARMS: A LOT
PERSONAL GROOMING: A LITTLE
DAILY ACTIVITIY SCORE: 14
DAILY ACTIVITIY SCORE: 14
CLIMB 3 TO 5 STEPS WITH RAILING: TOTAL
WALKING IN HOSPITAL ROOM: A LOT
MOVING TO AND FROM BED TO CHAIR: A LOT
STANDING UP FROM CHAIR USING ARMS: A LOT
PERSONAL GROOMING: A LOT
DRESSING REGULAR LOWER BODY CLOTHING: A LOT
TOILETING: A LITTLE
CLIMB 3 TO 5 STEPS WITH RAILING: TOTAL
DRESSING REGULAR LOWER BODY CLOTHING: TOTAL
DAILY ACTIVITIY SCORE: 18
TOILETING: A LOT
HELP NEEDED FOR BATHING: A LOT
HELP NEEDED FOR BATHING: A LITTLE
CLIMB 3 TO 5 STEPS WITH RAILING: TOTAL
WALKING IN HOSPITAL ROOM: A LOT

## 2023-11-07 ASSESSMENT — PAIN - FUNCTIONAL ASSESSMENT
PAIN_FUNCTIONAL_ASSESSMENT: 0-10

## 2023-11-07 ASSESSMENT — PAIN SCALES - GENERAL
PAINLEVEL_OUTOF10: 4
PAINLEVEL_OUTOF10: 4
PAINLEVEL_OUTOF10: 3
PAINLEVEL_OUTOF10: 4

## 2023-11-07 ASSESSMENT — PAIN DESCRIPTION - DESCRIPTORS: DESCRIPTORS: ACHING

## 2023-11-07 ASSESSMENT — ACTIVITIES OF DAILY LIVING (ADL)
BATHING_ASSISTANCE: MINIMAL
ADL_ASSISTANCE: INDEPENDENT

## 2023-11-07 NOTE — CONSULTS
"Nutrition Note  Reason for Assessment  Reason for Assessment: Admission nursing screening (Missed MST = 2)    Subjective      Energy Intake:  (no meal intakes recorded)  Food and Nutrient History: Pt care being provided at first attempted visit, and pt was soundly sleeping at the second attempted visit. Unable to obtain nutrition history. Noted per H&P pt drinks 1-2 vodka drinks per day.  Vitamin/Herbal Supplement Use: 25 mcg vitamin D3    Difficulty chewing: unable to assess  Difficulty swallowing: unable to assess    Objective   PMH, meds, and labs reviewed.  Dietary Orders (From admission, onward)       Start     Ordered    11/06/23 1805  Adult diet 2-3 grams sodium; 2000 mL fluid  Diet effective now        Question Answer Comment   Diet type 2-3 grams sodium    Dietary fluid restriction / 24h: 2000 mL fluid        11/06/23 1804                  Independent    GI per flowsheet:  Gastrointestinal  Gastrointestinal (WDL): Within Defined Limits  Last BM Date:  (about a week ago)  Last bowel movement documented:  (about a week ago)- constipated  Allergies: Patient has no known allergies.     Anthropometrics:  Height: 170.2 cm (5' 7\")  Weight: 93.1 kg (205 lb 4 oz)  BMI (Calculated): 32.14         IBW: 67.3 kg         Weight History / % Weight Change: No EMR weight records for the past year    Estimated Nutritional Needs:  Method for Estimating Needs: 3489-4126 (18-20 kcals/kg)    Method for Estimating Needs: 67-80 (1-1.2 g/kg IBW)    Method for Estimating Needs: 1 mL/kcal or as per MD    Nutrition Focused Physical Findings:   Orbital Fat Pads: Defer (pt unavailable x2)         Edema  Edema: +2 mild  Edema Location: BLE    Skin: Negative  Pain Score: 4     Nutrition Diagnosis   Patient has Malnutrition Diagnosis: No (insufficient data avilable at this time)    Patient has Nutrition Diagnosis: Yes  New  Nutrition Diagnosis 1: Predicted inadequate energy intake  Related to (1): acute illness  As Evidenced by (1): report " of poor intake PTA, anticipate pt will continue to eat poorly due to illness       Plan    Interventions  Individualized Nutrition Prescription Provided for : 2-3 gm sodium diet, 2000 mL fluid restriction per MD, magic cup BID (for an additional 290 kcals, 9 gm protein each)     Interventions: Meals and snacks, Medical food supplement  Meals and Snacks: Mineral-modified diet  Goal: intake >75% of meals  Medical Food Supplement: Commercial food  Goal: intake >75% of ONS      Nutrition Monitoring and Evaluation   Body Composition/Growth/Weight History  Monitoring and Evaluation Plan: Weight  Weight: Measured weight  Criteria: maintain weight, reweigh daily due to CHF  Food/Nutrient Related History Monitoring  Monitoring and Evaluation Plan: Energy intake, Fluid intake, Amount of food  Energy Intake: Estimated energy intake  Criteria: intake of meals and ONS to meet >75% of estimated nutrition needs  Fluid Intake: Estimated fluid intake  Criteria: intake of fluids to meet >75% of estimated needs  Amount of Food: Estimated amout of food, Medical food intake  Criteria: intake >75% of meals/ONS      Education Documentation  No documentation found.    Will assess education needs at follow up.    Time Spent (min): 45 minutes  Last Date of Nutrition Visit: 11/07/23  Nutrition Follow-Up Needed?: 3-5 days  Follow up Comment: 11/10, magic cup BID

## 2023-11-07 NOTE — PROGRESS NOTES
Physical Therapy    Physical Therapy Evaluation    Patient Name: Marcelino Oropeza  MRN: [id number]  Today's Date: 11/7/2023   Time Calculation  Start Time: 1017  Stop Time: 1039  Time Calculation (min): 22 min    Assessment/Plan   PT Assessment  PT Assessment Results: Decreased strength, Decreased endurance, Impaired balance, Decreased mobility, Pain  Rehab Prognosis: Good  Evaluation/Treatment Tolerance: Patient limited by fatigue, Patient limited by pain  End of Session Communication: Bedside nurse, Physician  Assessment Comment: pt with decreased mobility/gait , strength balance and endurance with need for skilled PT to address deficits and improve functional mobility .  End of Session Patient Position: Bed, 3 rail up, Alarm off, not on at start of session  IP OR SWING BED PT PLAN  Inpatient or Swing Bed: Inpatient  PT Plan  Treatment/Interventions: Bed mobility, Transfer training, Gait training, Stair training, Balance training, Strengthening, Endurance training, Therapeutic exercise, Therapeutic activity, Home exercise program  PT Plan: Skilled PT  PT Frequency: 3 times per week  PT Discharge Recommendations: Moderate intensity level of continued care  Equipment Recommended upon Discharge: Wheeled walker  PT Recommended Transfer Status: Assist x2    Subjective     Current Problem:  Patient Active Problem List   Diagnosis    Pneumonia of left lung due to infectious organism, unspecified part of lung       General Visit Information:  General  Reason for Referral: weakness/ impaired mobility  Referred By: OT/PT Enma 11/6  Past Medical History Relevant to Rehab: HTN, CAD, CHF, Arthritis, Afib, CKD, HLD, GOUT  Prior to Session Communication: Bedside nurse (cleared to see pt for therapy evals)  Patient Position Received: Bed, 3 rail up, Alarm off, not on at start of session (pt had a purwick and IV)  General Comment: pt is an 87 yo male that came to the \[facility]""ital on 11/6  with reports of BLE pain .  dx : gout , PNA and  UTI  test/ labs:  hgb 10.5, Uric acid : 11.1,   , CXR L side consolidation air space  renal US : neg.    Home Living:  Home Living  Type of Home: House  Lives With: Alone  Home Adaptive Equipment: None  Home Layout: One level  Home Access: Ramped entrance  Bathroom Shower/Tub: Tub/shower unit  Bathroom Equipment: Shower chair with back, Grab bars in shower    Prior Level of Function:  Prior Function Per Pt/Caregiver Report  Level of [place]: Independent with ADLs and functional transfers, Independent with homemaking with ambulation  ADL Assistance: Independent  Homemaking Assistance: Independent  Ambulatory Assistance: Independent  Prior Function Comments: per pt ind with mobility with occ cane use , ind with adls and iadls stil drives no falls    Precautions:  Precautions  Medical Precautions: Fall precautions    Objective     Pain:  Pain Assessment  Pain Assessment: 0-10  Pain Score: 4  Pain Type: Acute pain  Pain Location: Leg  Pain Orientation: Left, Right    Cognition:  Cognition  Overall Cognitive Status: Within Functional Limits    General Assessments:      Activity Tolerance  Endurance: Decreased tolerance for upright activites     Strength  Strength Comments: BLE 4-/5  Dynamic Sitting Balance  Dynamic Sitting-Comments: fair -  Dynamic Standing Balance  Dynamic Standing-Comments: poor    Functional Assessments:    Bed Mobility  Bed Mobility: Yes  Bed Mobility 1  Bed Mobility 1: Supine to sitting, Sitting to supine, Scooting  Level of Assistance 1: Moderate assistance  Bed Mobility Comments 1: x 2 person assistance for bed mobility to EOB  Transfers  Transfer: Yes  Transfer 1  Technique 1: Sit to stand, Stand to sit  Transfer Device 1: Walker (FWW)  Transfer Level of Assistance 1: Moderate assistance, +2  Trials/Comments 1: cues to push up from bed . cues to stand up tall and straighten legs.  stands with flexed knees  Ambulation/Gait Training  Ambulation/Gait Training Performed:  Yes  Ambulation/Gait Training 1  Surface 1: Level tile  Device 1: Rolling walker  Assistance 1: Moderate assistance (x 2 person assistance)  Quality of Gait 1: Inconsistent stride length  Comments/Distance (ft) 1: 2 side steps mod A x 2 with FWW to Head of bed . with increased pain and fatiuge    Extremity/Trunk Assessments:  RLE   RLE : Within Functional Limits  LLE   LLE : Within Functional Limits    Outcome Measures:  [facility] Basic Mobility  Turning from your back to your side while in a flat bed without using bedrails: A lot  Moving from lying on your back to sitting on the side of a flat bed without using bedrails: A lot  Moving to and from bed to chair (including a wheelchair): A lot  Standing up from a chair using your arms (e.g. wheelchair or bedside chair): A lot  To walk in hospital room: A lot  Climbing 3-5 steps with railing: Total  Basic Mobility - Total Score: 11    Goals:  Encounter Problems       Encounter Problems (Active)       PT Problem       Pt will demonstrate sba with bed mobility to edge of bed.   (Progressing)       Start:  11/07/23    Expected End:  11/21/23            Pt will demonstrate SBA with sit to stand/chair transfers with FWW.   (Progressing)       Start:  11/07/23    Expected End:  11/21/23            Pt will ambulate 30 feet with FWW SBA.   (Progressing)       Start:  11/07/23    Expected End:  11/21/23            Pt to demo improved BLE strength by being able to complete supine/seated thera ex 2x20 BLEs with 4 or less rest breaks .   (Progressing)       Start:  11/07/23    Expected End:  11/21/23               Pain - Adult            Education Documentation  Mobility Training, taught by Mikhail Mercado, PT at 11/7/2023  1:16 PM.  Learner: Patient  Readiness: Acceptance  Method: Explanation  Response: Verbalizes Understanding    Education Comments  No comments found.

## 2023-11-07 NOTE — CARE PLAN
The patient's goals for the shift include pain control    The clinical goals for the shift include pain control    Problem: Pain - Adult  Goal: Verbalizes/displays adequate comfort level or baseline comfort level  Outcome: Progressing     Problem: Safety - Adult  Goal: Free from fall injury  Outcome: Progressing     Problem: Discharge Planning  Goal: Discharge to home or other facility with appropriate resources  Outcome: Progressing     Problem: Chronic Conditions and Co-morbidities  Goal: Patient's chronic conditions and co-morbidity symptoms are monitored and maintained or improved  Outcome: Progressing     Problem: Heart Failure  Goal: Improved gas exchange this shift  Outcome: Progressing  Goal: Improved urinary output this shift  Outcome: Progressing  Goal: Reduction in peripheral edema within 24 hours  Outcome: Progressing  Goal: Report improvement of dyspnea/breathlessness this shift  Outcome: Progressing  Goal: Weight from fluid excess reduced over 2-3 days, then stabilize  Outcome: Progressing  Goal: Increase self care and/or family involvement in 24 hours  Outcome: Progressing

## 2023-11-07 NOTE — PROGRESS NOTES
11/07/23 1256   Discharge Planning   Living Arrangements Alone   Support Systems Children;Friends/neighbors   Assistance Needed states none PTA   Type of Residence Private residence   Number of Stairs to Enter Residence 0   Number of Stairs Within Residence 12   Do you have animals or pets at home? No   Patient expects to be discharged to: TBD   Does the patient need discharge transport arranged? Yes   RoundTrip coordination needed? Yes   Has discharge transport been arranged? No     Prefers HOME. Therapy eval pending. CHF Navigator working with pt.  [facility].

## 2023-11-07 NOTE — CONSULTS
[facility]  Consult Note           Reason for Consult:  TOMA  Requesting Physician:  Dr. Norwood    Chief Complaint:  leg pain  History Obtained From:  patient, electronic medical record    History of Present Ilness:    88 y.o. male with history s/f CAD s/p CABG, HTN, HFrEF, A.fib, gout, HLD and CKD stage III who presented for LT leg pain for a few days. In addition pt w/ productive cough. Per reported history pt drinks about 1-2 vodka drinks/night. On presentation pt hemodynamically stable, BP has however been trending down this AM. Scr 2.2 on presentation as well, notably was 1.5 last year. No labs on file in between. On lasix, losartan, jardiance and aldactone as outpatient. UA w/ mild proteinuria, glycosuria, ketones, small blood. Renal U/S showing Rt kidney 7.9 cm, LT 10.2 cm, uric acid 11. CXR w/ LT sided PNA, on abx. Has been on NS     Past Medical History:    Past Medical History:   Diagnosis Date    Gout     Hypertension        Past Surgical History:    Past Surgical History:   Procedure Laterality Date    AORTIC VALVE REPLACEMENT  04/14/2017    Aortic Valve Replacement    CORONARY ARTERY BYPASS GRAFT      OTHER SURGICAL HISTORY  04/14/2017    CABG Three Or More Nonautogenous Grafts    OTHER SURGICAL HISTORY  04/14/2017    Mitral Valve Repair       Home Medications:    No current facility-administered medications on file prior to encounter.     Current Outpatient Medications on File Prior to Encounter   Medication Sig Dispense Refill    apixaban (Eliquis) 2.5 mg tablet Take 1 tablet (2.5 mg) by mouth every 12 hours.      atorvastatin (Lipitor) 40 mg tablet Take 1 tablet (40 mg) by mouth once daily at bedtime.      cholecalciferol (Vitamin D-3) 25 MCG (1000 UT) tablet Take 1 tablet (25 mcg) by mouth once daily.      empagliflozin (Jardiance) 25 mg Take 0.5 tablets (12.5 mg) by mouth once daily.      furosemide (Lasix) 40 mg tablet Take 1 tablet (40 mg) by mouth once daily as needed (Fluid  Retention).      levothyroxine (Synthroid, Levoxyl) 75 mcg tablet Take 1 tablet (75 mcg) by mouth once daily in the morning. Take before meals.      losartan (Cozaar) 25 mg tablet Take 0.5 tablets (12.5 mg) by mouth once daily.      metoprolol succinate XL (Toprol-XL) 50 mg 24 hr tablet Take 1 tablet (50 mg) by mouth once daily.      spironolactone (Aldactone) 25 mg tablet Take 1 tablet (25 mg) by mouth once daily.      nitroglycerin (Nitrostat) 0.4 mg SL tablet Place 1 tablet (0.4 mg) under the tongue every 5 minutes if needed for chest pain (up to 3 doses).         Allergies:  Patient has no known allergies.    Social History:    Social History     Socioeconomic History    Marital status:      Spouse name: Not on file    Number of children: Not on file    Years of education: Not on file    Highest education level: Not on file   Occupational History    Not on file   Tobacco Use    Smoking status: Former     Packs/day: 1.00     Years: 25.00     Additional pack years: 0.00     Total pack years: 25.00     Types: Cigarettes     Start date:      Quit date:      Years since quittin.8    Smokeless tobacco: Never   Vaping Use    Vaping Use: Never used   Substance and Sexual Activity    Alcohol use: Not on file    Drug use: Not on file    Sexual activity: Not on file   Other Topics Concern    Not on file   Social History Narrative    Not on file     Social Determinants of Health     Financial Resource Strain: Low Risk  (2023)    Overall Financial Resource Strain (CARDIA)     Difficulty of Paying Living Expenses: Not hard at all   Food Insecurity: Not on file   Transportation Needs: No Transportation Needs (2023)    PRAPARE - Transportation     Lack of Transportation (Medical): No     Lack of Transportation (Non-Medical): No   Physical Activity: Not on file   Stress: Not on file   Social Connections: Not on file   Intimate Partner Violence: Not on file   Housing Stability: Low Risk  (2023)  "   Housing Stability Vital Sign     Unable to Pay for Housing in the Last Year: No     Number of Places Lived in the Last Year: 1     Unstable Housing in the Last Year: No       Family History:   No family history on file.    Review of Systems:   Positives in bold  Constitutional: fever, chills, fatigue, malaise   HENT:  rhinorrhea, sinus pain, sore throat, epistaxis    Eyes:  photophobia, visual disturbance, eye redness  Respiratory: shortness of breath, cough, hemoptysis    Cardiovascular: chest pain, palpitations, orthopnea, leg swelling   Gastrointestinal: abdominal pain, nausea, vomiting, diarrhea, constipation   Endocrine: polydipsia, polyphagia, cold intolerance, heat intolerance  Genitourinary: dysuria, flank pain, frequency, urgency   Hematologic: easy bruising, easy bleeding  Musculoskeletal: leg pain, neck pain, myalgias, arthalgias  Neurological: syncope, lightheadedness, dizziness, seizures, tremors, weakness  Psychiatric/Behavioral: anxiety, depression, hallucinations  Skin: rash, itching    Physical exam:   Constitutional:  VITALS:  BP 96/52 (BP Location: Left arm, Patient Position: Lying)   Pulse 64   Temp 36.6 °C (97.9 °F) (Temporal)   Resp 18   Ht 1.702 m (5' 7\")   Wt 93.1 kg (205 lb 4 oz)   SpO2 93%   BMI 32.15 kg/m²     General: alert, in no apparent distress  HEENT: normocephalic, atraumatic, anicteric  Neck: supple, no mass  Lungs: non-labored respirations, clear to auscultation bilaterally  Heart: regular rate and rhythm, no murmurs or rubs  Abdomen: soft, non-tender, non-distended  MSK: no joint swelling or tenderness  Ext: no cyanosis, no peripheral edema  Neuro: alert and oriented, no gross abnormalities  Psych: normal mood and affect    Data/  CBC:   Lab Results   Component Value Date    WBC 9.7 11/07/2023    RBC 3.29 (L) 11/07/2023    HGB 10.5 (L) 11/07/2023    HCT 32.0 (L) 11/07/2023    MCV 97 11/07/2023    MCH 31.9 11/07/2023    MCHC 32.8 11/07/2023    RDW 13.5 11/07/2023    PLT " 221 11/07/2023     BMP:    Lab Results   Component Value Date     (L) 11/07/2023    K 4.1 11/07/2023     11/07/2023    CO2 20 (L) 11/07/2023    BUN 53 (H) 11/07/2023    CREATININE 2.12 (H) 11/07/2023    CALCIUM 9.0 11/07/2023    GLUCOSE 142 (H) 11/07/2023       Assessment:  88 y.o. male with history s/f CAD s/p CABG, HTN, combined CHF (LVEF 25-30%), A.fib, gout, HLD and CKD stage III who presented for LT leg pain for a few days.    TOMA on CKD stage III: possibly due to volume depletion in setting of lasix, lisinopril and aldactone use, also ? If took NSAIDs (states sister gave him 400 mg of pain medications), pt takes jardiance, baseline Scr ~1.5 in 2022, p/w Scr 2.2, CKD risk factors CAD, ?SAGRARIO (LT kidney 10.2, RT 7.9 cm), HTN, ICM, UA w/ ketones, prot, blood and glucose   LT sided PnA  Acute gout flare: uric acid 11, on lasix, losartan  Metabolic acidosis  Anemia     Plan:  - ok to continue LR for now  - will eventually benefit from being on allopurinol      Thank you for the consultation. Will continue to follow  Please do not hesitate to call with questions.    Pooja Carlos MD

## 2023-11-07 NOTE — NURSING NOTE
Nurse received calls from Concepción (daughter and Keila (sister) discussed with patient we will update one family member- patient requested we call Concepción- nurse attempted to call no answer message left for  her to call back

## 2023-11-07 NOTE — PROGRESS NOTES
Occupational Therapy    Evaluation    Patient Name: Marcelino Oropeza  MRN: [id number]  Today's Date: 11/7/2023  Time Calculation  Start Time: 1017  Stop Time: 1039  Time Calculation (min): 22 min      Assessment:  Prognosis: Good  Evaluation/Treatment Tolerance: Patient limited by fatigue, Patient limited by pain  End of Session Communication: Bedside nurse, Physician  End of Session Patient Position: Bed, 3 rail up, Alarm off, not on at start of session  OT Assessment Results: Decreased ADL status, Decreased endurance, Decreased functional mobility, Decreased IADLs, Decreased safe judgment during ADL  Prognosis: Good  Evaluation/Treatment Tolerance: Patient limited by fatigue, Patient limited by pain  Plan:  Treatment Interventions: ADL retraining, Functional transfer training, Endurance training, Compensatory technique education, Patient/family training  OT Frequency: 2 times per week  OT Discharge Recommendations: Moderate intensity level of continued care  Treatment Interventions: ADL retraining, Functional transfer training, Endurance training, Compensatory technique education, Patient/family training    Subjective   Current Problem:  1. Pneumonia of left lung due to infectious organism, unspecified part of lung        2. Acute UTI        3. Acute renal failure superimposed on chronic kidney disease, unspecified acute renal failure type, unspecified CKD stage (CMS/HCC)        4. Acute gout due to renal impairment involving left ankle          General:  General  Reason for Referral: decline in self care performance  Referred By: OT/PT Enma 11/6  Past Medical History Relevant to Rehab: pt is an 89 yo male [facility]  with reports of BLE pain .  dx : gout , PNA and UTI  test/ labs:  hgb 10.5, Uric acid : 11.1,   , CXR L side consolidation air space  renal US : neg.    PMH: HTN, CAD, CHF, Arthritis, Afib, CKD, HLD, GOUT  Prior to Session Communication: Bedside nurse  Patient Position  "Received: Bed, 3 rail up, Alarm off, not on at start of session  General Comment: Pt pleasant and cooperative; cleared to participate by nursing; agreeable to OT eval; encouragement for OOB activity; declined sitting up in chair; heavy coughing with productive sputum  Precautions:  Medical Precautions: Fall precautions  Vital Signs:  SpO2: 98 % (room air)  Pain:  Pain Assessment  Pain Assessment: 0-10  Pain Score: 4  Pain Location: Ankle  Pain Orientation: Right, Left  Pain Descriptors:  (Pt states pain in B LE / ankles this date)    Objective   Cognition:  Overall Cognitive Status: Within Functional Limits   A&O x4; increased time thought processing  Able to follow 2-3 [place] direction >90% accuracy     Home Living:  Type of Home: House  Lives With: Alone  Home Adaptive Equipment: None  Home Layout: One level  Home Access: Ramped entrance  Bathroom Shower/Tub: Tub/shower unit  Bathroom Toilet: Standard  Bathroom Equipment: Shower chair with back, Grab bars in shower  Prior Function:  Level of [place]: Independent with ADLs and functional transfers, Independent with homemaking with ambulation  Homemaking Assistance: Independent  Ambulatory Assistance: Independent  Prior Function Comments: Pt reports independent with BADL and IADL and reports \"I manage\" with IADL task completion; states that he recently kicked out his \"art\" who lived with him and that this individual took the dryer and left the nonworking washer; question quality of home management. Pt drives; ambulates without AD, has cane; denies fall history.    ADL:  Equipment:  (denies)  Eating Assistance: Independent  Grooming Assistance: Stand by  Bathing Assistance: Minimal  UE Dressing Assistance: Minimal  LE Dressing Assistance: Maximal  Toileting Assistance with Device: Maximal  Functional Assistance: Maximal  Activity Tolerance:  Endurance: Decreased tolerance for upright activites  Bed Mobility/Transfers: Bed Mobility 1  Bed Mobility 1: Supine to " sitting, Sitting to supine  Level of Assistance 1: Moderate assistance  Bed Mobility Comments 1: x 2 person assistance for bed mobility to EOB    Transfer 1  Transfer From 1: Bed to, Sit to, Stand to, Toilet to  Transfer Device 1: Walker  Transfer Level of Assistance 1: Moderate assistance, Maximum verbal cues, Maximum tactile cues  Trials/Comments 1: cues for encouragement; flexed knees    Sitting Balance:   G/F  Standing Balance:  F-/P stand supported at FWW level; pt reports that it feels like his ankles are going to give out.    Sensation:   No sensory or proprioceptive deficits noted BUE.    Strength:  Strength Comments: BUE AROM and strength WFL; MMT 4/5 BUE    Hand Function:  Gross Grasp: Functional  Coordination: Functional    Outcome Measures:[facility] Daily Activity  Putting on and taking off regular lower body clothing: Total  Bathing (including washing, rinsing, drying): A lot  Putting on and taking off regular upper body clothing: A little  Toileting, which includes using toilet, bedpan or urinal: A lot  Taking care of personal grooming such as brushing teeth: A lot  Eating Meals: None  Daily Activity - Total Score: 14    Education Documentation  ADL Training and safe functional transfer training, taught by Negra Molina OT at 11/7/2023  1:46 PM.  Learner: Patient  Readiness: Acceptance  Method: Explanation, Demonstration  Response: Needs Reinforcement    Goals:  Encounter Problems       Encounter Problems (Active)       OT Goals       UB dress / bathe SBA (Progressing)       Start:  11/07/23    Expected End:  11/21/23            LB dress / bathe CGA (Progressing)       Start:  11/07/23    Expected End:  11/21/23            Pt demo F+/F stand balance for increased independence with toileting, hygiene, and self care tasks.  (Progressing)       Start:  11/07/23    Expected End:  11/21/23            Safe ADL transfers CGA (Progressing)       Start:  11/07/23    Expected End:  11/21/23            Pt  tolerate >10 min functional activity tolerance for ADL/IADL without c/o fatigue; apply ECT/WS techniques without cues.  (Progressing)       Start:  11/07/23    Expected End:  11/21/23

## 2023-11-07 NOTE — CARE PLAN
Problem: Pain - Adult  Goal: Verbalizes/displays adequate comfort level or baseline comfort level  Outcome: Progressing     The patient's goals for the shift include pain control    The clinical goals for the shift include pain control

## 2023-11-08 LAB
ANION GAP SERPL CALC-SCNC: 12 MMOL/L (ref 10–20)
BASOPHILS # BLD AUTO: 0.02 X10*3/UL (ref 0–0.1)
BASOPHILS NFR BLD AUTO: 0.1 %
BUN SERPL-MCNC: 63 MG/DL (ref 6–23)
CALCIUM SERPL-MCNC: 9 MG/DL (ref 8.6–10.3)
CHLORIDE SERPL-SCNC: 105 MMOL/L (ref 98–107)
CO2 SERPL-SCNC: 22 MMOL/L (ref 21–32)
CREAT SERPL-MCNC: 1.94 MG/DL (ref 0.5–1.3)
EOSINOPHIL # BLD AUTO: 0 X10*3/UL (ref 0–0.4)
EOSINOPHIL NFR BLD AUTO: 0 %
ERYTHROCYTE [DISTWIDTH] IN BLOOD BY AUTOMATED COUNT: 13.2 % (ref 11.5–14.5)
GFR SERPL CREATININE-BSD FRML MDRD: 33 ML/MIN/1.73M*2
GLUCOSE SERPL-MCNC: 148 MG/DL (ref 74–99)
HCT VFR BLD AUTO: 32.1 % (ref 41–52)
HGB BLD-MCNC: 10.5 G/DL (ref 13.5–17.5)
HOLD SPECIMEN: NORMAL
IMM GRANULOCYTES # BLD AUTO: 0.12 X10*3/UL (ref 0–0.5)
IMM GRANULOCYTES NFR BLD AUTO: 0.8 % (ref 0–0.9)
LYMPHOCYTES # BLD AUTO: 1.17 X10*3/UL (ref 0.8–3)
LYMPHOCYTES NFR BLD AUTO: 8 %
MAGNESIUM SERPL-MCNC: 2.63 MG/DL (ref 1.6–2.4)
MCH RBC QN AUTO: 31.5 PG (ref 26–34)
MCHC RBC AUTO-ENTMCNC: 32.7 G/DL (ref 32–36)
MCV RBC AUTO: 96 FL (ref 80–100)
MONOCYTES # BLD AUTO: 0.85 X10*3/UL (ref 0.05–0.8)
MONOCYTES NFR BLD AUTO: 5.8 %
NEUTROPHILS # BLD AUTO: 12.52 X10*3/UL (ref 1.6–5.5)
NEUTROPHILS NFR BLD AUTO: 85.3 %
NRBC BLD-RTO: 0 /100 WBCS (ref 0–0)
PLATELET # BLD AUTO: 239 X10*3/UL (ref 150–450)
POTASSIUM SERPL-SCNC: 4.1 MMOL/L (ref 3.5–5.3)
RBC # BLD AUTO: 3.33 X10*6/UL (ref 4.5–5.9)
SODIUM SERPL-SCNC: 135 MMOL/L (ref 136–145)
WBC # BLD AUTO: 14.7 X10*3/UL (ref 4.4–11.3)

## 2023-11-08 PROCEDURE — 36415 COLL VENOUS BLD VENIPUNCTURE: CPT | Performed by: STUDENT IN AN ORGANIZED HEALTH CARE EDUCATION/TRAINING PROGRAM

## 2023-11-08 PROCEDURE — 94760 N-INVAS EAR/PLS OXIMETRY 1: CPT

## 2023-11-08 PROCEDURE — 1210000001 HC SEMI-PRIVATE ROOM DAILY

## 2023-11-08 PROCEDURE — 97530 THERAPEUTIC ACTIVITIES: CPT | Mod: GP,CQ

## 2023-11-08 PROCEDURE — 2500000004 HC RX 250 GENERAL PHARMACY W/ HCPCS (ALT 636 FOR OP/ED): Performed by: STUDENT IN AN ORGANIZED HEALTH CARE EDUCATION/TRAINING PROGRAM

## 2023-11-08 PROCEDURE — 2500000001 HC RX 250 WO HCPCS SELF ADMINISTERED DRUGS (ALT 637 FOR MEDICARE OP): Performed by: STUDENT IN AN ORGANIZED HEALTH CARE EDUCATION/TRAINING PROGRAM

## 2023-11-08 PROCEDURE — 99232 SBSQ HOSP IP/OBS MODERATE 35: CPT | Performed by: STUDENT IN AN ORGANIZED HEALTH CARE EDUCATION/TRAINING PROGRAM

## 2023-11-08 PROCEDURE — 83735 ASSAY OF MAGNESIUM: CPT | Performed by: STUDENT IN AN ORGANIZED HEALTH CARE EDUCATION/TRAINING PROGRAM

## 2023-11-08 PROCEDURE — 80048 BASIC METABOLIC PNL TOTAL CA: CPT | Performed by: STUDENT IN AN ORGANIZED HEALTH CARE EDUCATION/TRAINING PROGRAM

## 2023-11-08 PROCEDURE — 85025 COMPLETE CBC W/AUTO DIFF WBC: CPT | Performed by: STUDENT IN AN ORGANIZED HEALTH CARE EDUCATION/TRAINING PROGRAM

## 2023-11-08 RX ORDER — CHOLECALCIFEROL (VITAMIN D3) 25 MCG
1000 TABLET ORAL DAILY
Status: DISCONTINUED | OUTPATIENT
Start: 2023-11-08 | End: 2023-11-12 | Stop reason: HOSPADM

## 2023-11-08 RX ADMIN — STANDARDIZED SENNA CONCENTRATE 17.2 MG: 8.6 TABLET ORAL at 08:03

## 2023-11-08 RX ADMIN — PIPERACILLIN SODIUM AND TAZOBACTAM SODIUM 3.38 G: 3; .375 INJECTION, SOLUTION INTRAVENOUS at 16:45

## 2023-11-08 RX ADMIN — ATORVASTATIN CALCIUM 40 MG: 20 TABLET, FILM COATED ORAL at 20:25

## 2023-11-08 RX ADMIN — APIXABAN 2.5 MG: 5 TABLET, FILM COATED ORAL at 20:25

## 2023-11-08 RX ADMIN — LEVOTHYROXINE SODIUM 75 MCG: 75 TABLET ORAL at 06:42

## 2023-11-08 RX ADMIN — AZITHROMYCIN MONOHYDRATE 500 MG: 500 INJECTION, POWDER, LYOPHILIZED, FOR SOLUTION INTRAVENOUS at 08:04

## 2023-11-08 RX ADMIN — PIPERACILLIN SODIUM AND TAZOBACTAM SODIUM 3.38 G: 3; .375 INJECTION, SOLUTION INTRAVENOUS at 12:41

## 2023-11-08 RX ADMIN — Medication 1000 UNITS: at 13:52

## 2023-11-08 RX ADMIN — PREDNISONE 40 MG: 20 TABLET ORAL at 08:03

## 2023-11-08 RX ADMIN — METOPROLOL SUCCINATE 50 MG: 50 TABLET, EXTENDED RELEASE ORAL at 20:25

## 2023-11-08 RX ADMIN — PSYLLIUM HUSK 1 PACKET: 3.4 POWDER ORAL at 11:39

## 2023-11-08 RX ADMIN — EMPAGLIFLOZIN 12.5 MG: 25 TABLET, FILM COATED ORAL at 08:04

## 2023-11-08 RX ADMIN — CEFTRIAXONE SODIUM 1 G: 1 INJECTION, SOLUTION INTRAVENOUS at 01:08

## 2023-11-08 RX ADMIN — SODIUM CHLORIDE 10 ML: 9 INJECTION, SOLUTION INTRAVENOUS at 16:45

## 2023-11-08 RX ADMIN — SODIUM CHLORIDE, POTASSIUM CHLORIDE, SODIUM LACTATE AND CALCIUM CHLORIDE 75 ML/HR: 600; 310; 30; 20 INJECTION, SOLUTION INTRAVENOUS at 01:09

## 2023-11-08 RX ADMIN — APIXABAN 2.5 MG: 5 TABLET, FILM COATED ORAL at 08:04

## 2023-11-08 ASSESSMENT — COGNITIVE AND FUNCTIONAL STATUS - GENERAL
MOVING FROM LYING ON BACK TO SITTING ON SIDE OF FLAT BED WITH BEDRAILS: A LOT
CLIMB 3 TO 5 STEPS WITH RAILING: TOTAL
DAILY ACTIVITIY SCORE: 14
MOVING FROM LYING ON BACK TO SITTING ON SIDE OF FLAT BED WITH BEDRAILS: A LOT
TOILETING: A LOT
PERSONAL GROOMING: A LOT
DAILY ACTIVITIY SCORE: 14
PERSONAL GROOMING: A LOT
MOVING TO AND FROM BED TO CHAIR: A LOT
MOBILITY SCORE: 11
MOVING FROM LYING ON BACK TO SITTING ON SIDE OF FLAT BED WITH BEDRAILS: A LOT
MOVING TO AND FROM BED TO CHAIR: A LOT
MOBILITY SCORE: 11
DRESSING REGULAR UPPER BODY CLOTHING: A LITTLE
HELP NEEDED FOR BATHING: A LOT
DRESSING REGULAR LOWER BODY CLOTHING: TOTAL
DRESSING REGULAR UPPER BODY CLOTHING: A LITTLE
DRESSING REGULAR LOWER BODY CLOTHING: TOTAL
STANDING UP FROM CHAIR USING ARMS: A LOT
WALKING IN HOSPITAL ROOM: A LOT
MOBILITY SCORE: 11
STANDING UP FROM CHAIR USING ARMS: A LOT
CLIMB 3 TO 5 STEPS WITH RAILING: TOTAL
HELP NEEDED FOR BATHING: A LOT
WALKING IN HOSPITAL ROOM: A LOT
TURNING FROM BACK TO SIDE WHILE IN FLAT BAD: A LOT
STANDING UP FROM CHAIR USING ARMS: A LOT
MOVING TO AND FROM BED TO CHAIR: A LOT
TOILETING: A LOT
CLIMB 3 TO 5 STEPS WITH RAILING: TOTAL
WALKING IN HOSPITAL ROOM: A LOT

## 2023-11-08 ASSESSMENT — PAIN - FUNCTIONAL ASSESSMENT: PAIN_FUNCTIONAL_ASSESSMENT: 0-10

## 2023-11-08 ASSESSMENT — PAIN SCALES - GENERAL
PAINLEVEL_OUTOF10: 0 - NO PAIN
PAINLEVEL_OUTOF10: 0 - NO PAIN

## 2023-11-08 NOTE — PROGRESS NOTES
Spoke with patient and his daughter Concepción, would like patient to go to [facility] in [place]. Choice given, they still prefer this facility, patient has been there before. Referral made per ANGIE Santos, will wait for their response and start precert if accepted.

## 2023-11-08 NOTE — CARE PLAN
Problem: Pain - Adult  Goal: Verbalizes/displays adequate comfort level or baseline comfort level  Outcome: Progressing     Problem: Safety - Adult  Goal: Free from fall injury  Outcome: Progressing

## 2023-11-08 NOTE — PROGRESS NOTES
Marcelino Oropeza is a 88 y.o. male on day 2 of admission presenting with Pneumonia of left lung due to infectious organism, unspecified part of lung.      Subjective     Patient is a stable, in no acute distress, complaining of cough with a sputum and weakness  Vitamin D was replaced weekly PT/OT recommended moderate intensity level on discharge  Nephrology recommended to stop IV fluid hydration as kidney function is improving      Objective     Last Recorded Vitals  /60   Pulse 63   Temp 36.5 °C (97.7 °F)   Resp 17   Wt 93.1 kg (205 lb 4 oz)   SpO2 96%   Intake/Output last 3 Shifts:    Intake/Output Summary (Last 24 hours) at 11/8/2023 1314  Last data filed at 11/8/2023 1247  Gross per 24 hour   Intake 2908.75 ml   Output 2050 ml   Net 858.75 ml       Admission Weight  Weight: 90.7 kg (200 lb) (11/06/23 1145)    Daily Weight  11/07/23 : 93.1 kg (205 lb 4 oz)    Image Results  US renal complete  Narrative: Interpreted By:  Mc Bernard,   STUDY:  US RENAL COMPLETE;  11/6/2023 9:10 pm      INDICATION:  Signs/Symptoms:robert.      COMPARISON:  None.      ACCESSION NUMBER(S):  [id number]      ORDERING CLINICIAN:  CASSANDRA STEWART      TECHNIQUE:  Real-time sonographic evaluation of the kidneys and urinary bladder  was performed.      FINDINGS:  RIGHT KIDNEY:  Right kidney measures  7.9 cm.  No shadowing calculus or right  hydronephrosis is visualized.  No discrete renal lesion is visualized.      LEFT KIDNEY:  Left kidney measures  10.2 cm.  No shadowing calculus or left  hydronephrosis is visualized.  A left renal lower pole small ovoid  hypoechoic lesion likely is a small cyst measuring 1 x 0.80.8 cm.      BLADDER:  Urinary bladder is partially distended.      No intraluminal mass or shadowing calculus is demonstrated.      Impression: No hydronephrosis bilaterally.      No focal urinary bladder abnormality identified.      MACRO:  None.      Signed by: Mc Bernard 11/7/2023 8:32 AM  Dictation  workstation:   [id number]      Physical Exam    General: Well-developed male, in no acute distress  HEENT: AT, NC, no JVD, no lymphadenopathy, neck supple  Lungs: no wheezing, no crackles, diffuse rhonchi  Cardiac: Normal S1-S2, no murmur, no gallop  Abdomen: Soft, nontender, no distention, positive bowel sound  Extremities: Bilateral lower extremity swelling, nonpitting, left ankle exquisitely tender to touch and ROM   Neurological: Alert awake oriented x3, sensation intact, clear speech      Assessment/Plan     Principal Problem:    Pneumonia of left lung due to infectious organism, unspecified part of lung    Gram Negative Bacterial Pneumonia  -Procalcitonin elevated, 0.85, consistent with bacterial pneumonia  -continue Zosyn   -Sputum cx pending  -Strep urine/Legionella antigen negative.      Acute Gout Flare  -cw prednisone taper and monitor.  -Agree with nephro, would likely benefit from allopurinol initiation once gout flare resolves.      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 Most recent echo reviewed, revealed EF of 25-30%. BNP indeterminate and patient does not appear grossly overloaded.  Will monitor     Hx Afib, CAD, HTN, HLD, Arthritis  -Home meds resumed as applicable     DVT Ppx: Eliquis  Disposition: PT/OT recommended moderate intensity level on discharge  Anticipate discharge in next 24-48 hours        German Sullivan MD

## 2023-11-08 NOTE — PROGRESS NOTES
Per ivan pt. Will need snf placemnt, has spoken to pt. And dtr. Who [facility] as [facility] has been there in the past.  [facility].  Pt. With [employer] medicare advantage ins., will require ins. Precert.    [facility] able to accept pt. Hospital ins. Team to obtain precert.

## 2023-11-08 NOTE — PROGRESS NOTES
11/08/23 1446   Discharge Planning   Living Arrangements Alone   Support Systems Children   Type of Residence Private residence   Number of Stairs to Enter Residence 6   Home or Post Acute Services Post acute facilities (Rehab/SNF/etc)   Type of Post Acute Facility Services Skilled nursing   Patient expects to be discharged to: [facility]   Does the patient need discharge transport arranged? No   RoundTrip coordination needed? Yes   Has discharge transport been arranged? No   What day is the transport expected? 11/09/23   Patient Choice   Provider Choice list and CMS website (https://medicare.gov/care-compare#search) for post-acute Quality and Resource Measure Data were provided and reviewed with: Patient;Family   Patient / Family choosing to utilize agency / facility established prior to hospitalization No     Referral made, waiting on response from facility.

## 2023-11-08 NOTE — PROGRESS NOTES
"Physical Therapy    Physical Therapy Treatment    Patient Name: Marcelino Oropeza  MRN: [id number]  Today's Date: 11/8/2023  Time Calculation  Start Time: 1140  Stop Time: 1204  Time Calculation (min): 24 min       Assessment/Plan   End of Session Communication: Bedside nurse, PCT/NA/CTA  Assessment Comment: Call-light, phone, and traytable within reach.  End of Session Patient Position: Bed, 3 rail up, Alarm off, not on at start of session. BLE elevated on pillow for pressure redistribution.  PT Plan  Inpatient/Swing Bed or Outpatient: Inpatient  PT Plan  Treatment/Interventions: Bed mobility, Transfer training, Gait training, Stair training, Balance training, Strengthening, Endurance training, Therapeutic exercise, Therapeutic activity, Home exercise program  PT Plan: Skilled PT  PT Frequency: 3 times per week  PT Discharge Recommendations: Moderate intensity level of continued care  Equipment Recommended upon Discharge: Wheeled walker  PT Recommended Transfer Status: Assist x2      General Visit Information:   PT  Visit  PT Received On: 11/08/23  General  Family/Caregiver Present: No  Prior to Session Communication: Bedside nurse  Patient Position Received: Bed, 3 rail up, Alarm off, not on at start of session  General Comment: Pleasant and cooperative; apprehensive about OOB activities.    General Observations:   General Observation: External male catheter    Subjective     Precautions:  Precautions  Medical Precautions: Fall precautions       Objective     Pain:  Pain Assessment  Pain Assessment:  (\"Not too bad\" (B)LE gout pain.)    Treatments:  Therapeutic Exercise  Therapeutic Exercise Performed:  (Instructed patient in seated ther ex(AP/LAQ/Hipflex). Patient did not perform multiple reps 2° he did not want to cause increased pain.)     Balance/Neuromuscular Re-Education  Balance/Neuromuscular Re-Education Activity Performed:  (Tolerated EOB sitting ~15min. Supported/unsupported. Able to reach within arms length " without LOB. Slight posterior lean with unsupported sitting, but able to return to upright position without (A).)  Bed Mobility  Bed Mobility: Yes  Bed Mobility 1  Bed Mobility 1: Supine to sitting, Sitting to supine  Level of Assistance 1: Moderate assistance  Bed Mobility Comments 1: HOB ~35°. Hand over hand (A) to reach across body to use the bed rail for support. (A) to lift UB from HOB while moving LEs over the EOB and (A) to support UB while lifting LEs onto the bed. EOB scooting 3x with press-ups and Mod(A). Max(A)x2 to boost to HOB with use of bed pad. Patient did not want to transfer OOB to the chair this session.                Outcome Measures:  [facility] Basic Mobility  Turning from your back to your side while in a flat bed without using bedrails: A lot  Moving from lying on your back to sitting on the side of a flat bed without using bedrails: A lot  Moving to and from bed to chair (including a wheelchair): A lot  Standing up from a chair using your arms (e.g. wheelchair or bedside chair): A lot  To walk in hospital room: A lot  Climbing 3-5 steps with railing: Total  Basic Mobility - Total Score: 11    Education Documentation  Mobility Training, taught by Tammie Mendoza PTA at 11/8/2023  3:39 PM.  Learner: Patient  Readiness: Acceptance  Method: Explanation, Demonstration  Response: Verbalizes Understanding, Needs Reinforcement  Comment: See therapy note.      EDUCATION:  Education  Individual(s) Educated: Patient  Education Provided: Fall Risk (Safety with bed mobility/transfers.)    Encounter Problems       Encounter Problems (Active)       PT Problem       Pt will demonstrate sba with bed mobility to edge of bed.   (Progressing)       Start:  11/07/23    Expected End:  11/21/23            Pt will demonstrate SBA with sit to stand/chair transfers with FWW.   (Progressing)       Start:  11/07/23    Expected End:  11/21/23            Pt will ambulate 30 feet with FWW SBA.   (Progressing)       Start:   11/07/23    Expected End:  11/21/23            Pt to demo improved BLE strength by being able to complete supine/seated thera ex 2x20 BLEs with 4 or less rest breaks .   (Progressing)       Start:  11/07/23    Expected End:  11/21/23               Pain - Adult

## 2023-11-08 NOTE — PROGRESS NOTES
Nephrology Progress Note    Assessment:  88 y.o. male with history s/f CAD s/p CABG, HTN, combined CHF (LVEF 25-30%), A.fib, gout, HLD and CKD stage III who presented for LT leg pain for a few days.     TOMA on CKD stage III: possibly due to volume depletion in setting of lasix, lisinopril and aldactone use, also ? If took NSAIDs (states sister gave him 400 mg of pain medications), pt takes jardiance, baseline Scr ~1.5 in 2022, p/w Scr 2.2, CKD risk factors CAD, ?SAGRARIO (LT kidney 10.2, RT 7.9 cm), HTN, ICM, UA w/ ketones, prot, blood and glucose   LT sided PnA  Acute gout flare: uric acid 11, on lasix, losartan  Metabolic acidosis: improving   Anemia      Plan:  - ok to stop IVFs at this time  - encourage PO intake   - will eventually benefit from being on allopurinol after flare       Subjective:  Admit Date: 11/6/2023    Interval History: function improving, still coughing a lot     Medications:  Scheduled Meds:apixaban, 2.5 mg, oral, q12h  atorvastatin, 40 mg, oral, Nightly  azithromycin, 500 mg, intravenous, Daily  empagliflozin, 12.5 mg, oral, Daily  levothyroxine, 75 mcg, oral, Daily before breakfast  [Held by provider] losartan, 12.5 mg, oral, Daily  metoprolol succinate XL, 50 mg, oral, Daily  piperacillin-tazobactam, 3.375 g, intravenous, Once  piperacillin-tazobactam, 3.375 g, intravenous, q8h   And  sodium chloride, 10 mL, intravenous, q8h  predniSONE, 40 mg, oral, Daily   Followed by  [START ON 11/11/2023] predniSONE, 20 mg, oral, Daily   Followed by  [START ON 11/14/2023] predniSONE, 10 mg, oral, Daily   Followed by  [START ON 11/17/2023] predniSONE, 5 mg, oral, Daily  psyllium, 1 packet, oral, Daily  sennosides, 2 tablet, oral, BID  [Held by provider] spironolactone, 25 mg, oral, Daily      Continuous Infusions:lactated Ringer's, 75 mL/hr, Last Rate: 75 mL/hr (11/08/23 0109)        CBC:   Lab Results   Component Value Date    WBC 14.7 (H) 11/08/2023    RBC 3.33 (L) 11/08/2023    HGB 10.5 (L) 11/08/2023     "HCT 32.1 (L) 11/08/2023    MCV 96 11/08/2023    MCH 31.5 11/08/2023    MCHC 32.7 11/08/2023    RDW 13.2 11/08/2023     11/08/2023     BMP:    Lab Results   Component Value Date     (L) 11/08/2023    K 4.1 11/08/2023     11/08/2023    CO2 22 11/08/2023    BUN 63 (H) 11/08/2023    CREATININE 1.94 (H) 11/08/2023    CALCIUM 9.0 11/08/2023    GLUCOSE 148 (H) 11/08/2023       Objective:  Vitals: /56 (BP Location: Left arm, Patient Position: Lying)   Pulse 67   Temp 36.4 °C (97.5 °F) (Temporal)   Resp 17   Ht 1.702 m (5' 7\")   Wt 93.1 kg (205 lb 4 oz)   SpO2 94%   BMI 32.15 kg/m²    Wt Readings from Last 3 Encounters:   11/07/23 93.1 kg (205 lb 4 oz)      24HR INTAKE/OUTPUT:    Intake/Output Summary (Last 24 hours) at 11/8/2023 1130  Last data filed at 11/8/2023 0108  Gross per 24 hour   Intake 1798.75 ml   Output 1050 ml   Net 748.75 ml       General: alert, in no apparent distress  HEENT: normocephalic, atraumatic, anicteric  Neck: supple, no mass  Lungs: non-labored respirations, clear to auscultation bilaterally  Heart: regular rate and rhythm, no murmurs or rubs  Abdomen: soft, non-tender, non-distended  Ext: no cyanosis, no peripheral edema  Neuro: alert and oriented, no gross abnormalities      Electronically signed by Pooja Carlos MD, MD              "

## 2023-11-09 LAB
ANION GAP SERPL CALC-SCNC: 10 MMOL/L (ref 10–20)
BACTERIA SPEC RESP CULT: NORMAL
BASOPHILS # BLD AUTO: 0.05 X10*3/UL (ref 0–0.1)
BASOPHILS NFR BLD AUTO: 0.3 %
BUN SERPL-MCNC: 52 MG/DL (ref 6–23)
CALCIUM SERPL-MCNC: 9 MG/DL (ref 8.6–10.3)
CHLORIDE SERPL-SCNC: 109 MMOL/L (ref 98–107)
CO2 SERPL-SCNC: 21 MMOL/L (ref 21–32)
CREAT SERPL-MCNC: 1.86 MG/DL (ref 0.5–1.3)
EOSINOPHIL # BLD AUTO: 0.02 X10*3/UL (ref 0–0.4)
EOSINOPHIL NFR BLD AUTO: 0.1 %
ERYTHROCYTE [DISTWIDTH] IN BLOOD BY AUTOMATED COUNT: 13.3 % (ref 11.5–14.5)
GFR SERPL CREATININE-BSD FRML MDRD: 34 ML/MIN/1.73M*2
GLUCOSE SERPL-MCNC: 126 MG/DL (ref 74–99)
GRAM STN SPEC: NORMAL
GRAM STN SPEC: NORMAL
HCT VFR BLD AUTO: 31.5 % (ref 41–52)
HGB BLD-MCNC: 10.5 G/DL (ref 13.5–17.5)
HOLD SPECIMEN: NORMAL
IMM GRANULOCYTES # BLD AUTO: 0.42 X10*3/UL (ref 0–0.5)
IMM GRANULOCYTES NFR BLD AUTO: 2.2 % (ref 0–0.9)
LYMPHOCYTES # BLD AUTO: 1.77 X10*3/UL (ref 0.8–3)
LYMPHOCYTES NFR BLD AUTO: 9.2 %
MAGNESIUM SERPL-MCNC: 2.62 MG/DL (ref 1.6–2.4)
MCH RBC QN AUTO: 32.1 PG (ref 26–34)
MCHC RBC AUTO-ENTMCNC: 33.3 G/DL (ref 32–36)
MCV RBC AUTO: 96 FL (ref 80–100)
MONOCYTES # BLD AUTO: 1.04 X10*3/UL (ref 0.05–0.8)
MONOCYTES NFR BLD AUTO: 5.4 %
NEUTROPHILS # BLD AUTO: 15.88 X10*3/UL (ref 1.6–5.5)
NEUTROPHILS NFR BLD AUTO: 82.8 %
NRBC BLD-RTO: 0 /100 WBCS (ref 0–0)
PLATELET # BLD AUTO: 257 X10*3/UL (ref 150–450)
POTASSIUM SERPL-SCNC: 4.4 MMOL/L (ref 3.5–5.3)
RBC # BLD AUTO: 3.27 X10*6/UL (ref 4.5–5.9)
SODIUM SERPL-SCNC: 136 MMOL/L (ref 136–145)
WBC # BLD AUTO: 19.2 X10*3/UL (ref 4.4–11.3)

## 2023-11-09 PROCEDURE — 83735 ASSAY OF MAGNESIUM: CPT | Performed by: STUDENT IN AN ORGANIZED HEALTH CARE EDUCATION/TRAINING PROGRAM

## 2023-11-09 PROCEDURE — 85025 COMPLETE CBC W/AUTO DIFF WBC: CPT | Performed by: STUDENT IN AN ORGANIZED HEALTH CARE EDUCATION/TRAINING PROGRAM

## 2023-11-09 PROCEDURE — 36415 COLL VENOUS BLD VENIPUNCTURE: CPT | Performed by: STUDENT IN AN ORGANIZED HEALTH CARE EDUCATION/TRAINING PROGRAM

## 2023-11-09 PROCEDURE — 99232 SBSQ HOSP IP/OBS MODERATE 35: CPT | Performed by: STUDENT IN AN ORGANIZED HEALTH CARE EDUCATION/TRAINING PROGRAM

## 2023-11-09 PROCEDURE — 2500000001 HC RX 250 WO HCPCS SELF ADMINISTERED DRUGS (ALT 637 FOR MEDICARE OP): Performed by: STUDENT IN AN ORGANIZED HEALTH CARE EDUCATION/TRAINING PROGRAM

## 2023-11-09 PROCEDURE — 2500000004 HC RX 250 GENERAL PHARMACY W/ HCPCS (ALT 636 FOR OP/ED): Performed by: STUDENT IN AN ORGANIZED HEALTH CARE EDUCATION/TRAINING PROGRAM

## 2023-11-09 PROCEDURE — 80048 BASIC METABOLIC PNL TOTAL CA: CPT | Performed by: STUDENT IN AN ORGANIZED HEALTH CARE EDUCATION/TRAINING PROGRAM

## 2023-11-09 PROCEDURE — 1210000001 HC SEMI-PRIVATE ROOM DAILY

## 2023-11-09 RX ORDER — BENZONATATE 100 MG/1
100 CAPSULE ORAL 3 TIMES DAILY PRN
Status: DISCONTINUED | OUTPATIENT
Start: 2023-11-09 | End: 2023-11-12 | Stop reason: HOSPADM

## 2023-11-09 RX ORDER — COLCHICINE 0.6 MG/1
0.6 TABLET ORAL 2 TIMES DAILY
Status: DISCONTINUED | OUTPATIENT
Start: 2023-11-09 | End: 2023-11-12

## 2023-11-09 RX ADMIN — COLCHICINE 0.6 MG: 0.6 TABLET ORAL at 20:35

## 2023-11-09 RX ADMIN — BENZONATATE 100 MG: 100 CAPSULE ORAL at 14:32

## 2023-11-09 RX ADMIN — SODIUM CHLORIDE 10 ML: 9 INJECTION, SOLUTION INTRAVENOUS at 08:03

## 2023-11-09 RX ADMIN — PIPERACILLIN SODIUM AND TAZOBACTAM SODIUM 3.38 G: 3; .375 INJECTION, SOLUTION INTRAVENOUS at 16:51

## 2023-11-09 RX ADMIN — ATORVASTATIN CALCIUM 40 MG: 20 TABLET, FILM COATED ORAL at 20:35

## 2023-11-09 RX ADMIN — APIXABAN 2.5 MG: 5 TABLET, FILM COATED ORAL at 08:01

## 2023-11-09 RX ADMIN — SODIUM CHLORIDE 10 ML: 9 INJECTION, SOLUTION INTRAVENOUS at 16:51

## 2023-11-09 RX ADMIN — LEVOTHYROXINE SODIUM 75 MCG: 75 TABLET ORAL at 06:21

## 2023-11-09 RX ADMIN — SODIUM CHLORIDE 10 ML: 9 INJECTION, SOLUTION INTRAVENOUS at 00:57

## 2023-11-09 RX ADMIN — METOPROLOL SUCCINATE 50 MG: 50 TABLET, EXTENDED RELEASE ORAL at 20:35

## 2023-11-09 RX ADMIN — COLCHICINE 0.6 MG: 0.6 TABLET ORAL at 14:32

## 2023-11-09 RX ADMIN — PIPERACILLIN SODIUM AND TAZOBACTAM SODIUM 3.38 G: 3; .375 INJECTION, SOLUTION INTRAVENOUS at 08:03

## 2023-11-09 RX ADMIN — BENZONATATE 100 MG: 100 CAPSULE ORAL at 20:39

## 2023-11-09 RX ADMIN — APIXABAN 2.5 MG: 5 TABLET, FILM COATED ORAL at 20:35

## 2023-11-09 RX ADMIN — STANDARDIZED SENNA CONCENTRATE 17.2 MG: 8.6 TABLET ORAL at 08:01

## 2023-11-09 RX ADMIN — PREDNISONE 40 MG: 20 TABLET ORAL at 08:01

## 2023-11-09 RX ADMIN — PIPERACILLIN SODIUM AND TAZOBACTAM SODIUM 3.38 G: 3; .375 INJECTION, SOLUTION INTRAVENOUS at 00:57

## 2023-11-09 RX ADMIN — EMPAGLIFLOZIN 12.5 MG: 25 TABLET, FILM COATED ORAL at 08:02

## 2023-11-09 RX ADMIN — Medication 1000 UNITS: at 08:01

## 2023-11-09 ASSESSMENT — COGNITIVE AND FUNCTIONAL STATUS - GENERAL
WALKING IN HOSPITAL ROOM: A LOT
TURNING FROM BACK TO SIDE WHILE IN FLAT BAD: A LOT
DAILY ACTIVITIY SCORE: 14
MOBILITY SCORE: 12
DRESSING REGULAR UPPER BODY CLOTHING: A LITTLE
HELP NEEDED FOR BATHING: A LOT
DRESSING REGULAR UPPER BODY CLOTHING: A LITTLE
HELP NEEDED FOR BATHING: A LOT
TURNING FROM BACK TO SIDE WHILE IN FLAT BAD: A LOT
DRESSING REGULAR LOWER BODY CLOTHING: TOTAL
DRESSING REGULAR LOWER BODY CLOTHING: TOTAL
TOILETING: A LOT
CLIMB 3 TO 5 STEPS WITH RAILING: TOTAL
MOVING FROM LYING ON BACK TO SITTING ON SIDE OF FLAT BED WITH BEDRAILS: A LOT
MOVING FROM LYING ON BACK TO SITTING ON SIDE OF FLAT BED WITH BEDRAILS: A LOT
TOILETING: A LOT
STANDING UP FROM CHAIR USING ARMS: A LOT
CLIMB 3 TO 5 STEPS WITH RAILING: TOTAL
DAILY ACTIVITIY SCORE: 14
DRESSING REGULAR UPPER BODY CLOTHING: A LITTLE
PERSONAL GROOMING: A LOT
CLIMB 3 TO 5 STEPS WITH RAILING: TOTAL
HELP NEEDED FOR BATHING: A LOT
TOILETING: A LOT
MOVING TO AND FROM BED TO CHAIR: A LOT
MOBILITY SCORE: 11
WALKING IN HOSPITAL ROOM: A LOT
PERSONAL GROOMING: A LOT
MOVING TO AND FROM BED TO CHAIR: A LOT
MOVING TO AND FROM BED TO CHAIR: A LOT
MOBILITY SCORE: 11
STANDING UP FROM CHAIR USING ARMS: A LOT
MOVING FROM LYING ON BACK TO SITTING ON SIDE OF FLAT BED WITH BEDRAILS: A LITTLE
TURNING FROM BACK TO SIDE WHILE IN FLAT BAD: A LOT
DRESSING REGULAR LOWER BODY CLOTHING: TOTAL
PERSONAL GROOMING: A LOT
DAILY ACTIVITIY SCORE: 14
WALKING IN HOSPITAL ROOM: A LOT
STANDING UP FROM CHAIR USING ARMS: A LOT

## 2023-11-09 ASSESSMENT — PAIN SCALES - GENERAL: PAINLEVEL_OUTOF10: 0 - NO PAIN

## 2023-11-09 NOTE — DOCUMENTATION CLARIFICATION NOTE
"    PATIENT:               SWATHI SOUZA  ACCT #:                  [id number]  MRN:                       [id number]  :                       1935  ADMIT DATE:       2023 10:41 AM  DISCH DATE:  RESPONDING PROVIDER #:        [id number]          PROVIDER RESPONSE TEXT:    CAP    CDI QUERY TEXT:    [facility]_Pneumonia Specificity        Instruction:    Based on your assessment of the patient and the clinical information, please provide the requested documentation by clicking on the appropriate radio button and enter any additional information if prompted.    Question: Please further specify the type of pneumonia being treated    When answering this query, please exercise your independent professional judgment. The fact that a question is being asked, does not imply that any particular answer is desired or expected.    The patient's clinical indicators include:  Clinical Information: 87 yo female admitted with pneumonia, gout flare, and UTI.    Clinical Indicators:  Vitals (2147) Temp-37.4  HR-96  RR-18  BP-123/58  SPO2-93% RA    Procalcitonin: 0.85 ( 0600)  WBC: 11.0 ( 0013), 9.7 ( 0600)   CXR:  Left basilar airspace consolidation is seen and may represent small pleural effusion, atelectasis and/or pneumonia.     PN Dr. Norwood: \"Gram Negative Bacterial Pneumonia  -Patient with productive purulent sputum, hoarse cough, and imaging findings consistent with pneumonia at time of presentation.  -Procalcitonin elevated, 0.85, consistent with bacterial pneumonia\"    Treatment: cefTRIAXone (Rocephin) IVPB 1 g q 24, azithromycin (Zithromax) in sodium chloride 0.9 % 250 mL  mg daily  piperacillin-tazobactam-dextrose (Zosyn) IV 3.375 g q 8 hr started     Risk Factors: Pneumonia  Options provided:  -- CAP  -- Gram Negative PNA  -- Other - I will add my own diagnosis  -- Refer to Clinical Documentation Reviewer    Query created by: Cynthia Kee on 2023 4:50 PM      Electronically signed " by:  PAUL YU MD 11/8/2023 7:02 PM

## 2023-11-09 NOTE — PROGRESS NOTES
Physical Therapy                 Therapy Communication Note    Patient Name: Marcelino Oropeza  MRN: [id number]  Today's Date: 11/9/2023     Discipline: Physical Therapy    Missed Visit Reason: Patient refused (Encouragement provided re: benefits of increasing activities. Pt continued to decline PT treatment stating he was cold, but has been moving around better in the bed on his own today. Agreeable to therapy reattempting PT treatment tomorrow. Nursing aware.)

## 2023-11-09 NOTE — CARE PLAN
The patient's goals for the shift include pain control    The clinical goals for the shift include get stronger, less SOB

## 2023-11-09 NOTE — CARE PLAN
Problem: Pain - Adult  Goal: Verbalizes/displays adequate comfort level or baseline comfort level  Outcome: Progressing     Problem: Safety - Adult  Goal: Free from fall injury  Outcome: Progressing     Problem: Chronic Conditions and Co-morbidities  Goal: Patient's chronic conditions and co-morbidity symptoms are monitored and maintained or improved  Outcome: Progressing

## 2023-11-09 NOTE — PROGRESS NOTES
Patient has precert for [facility], will be able to be transferred once medically stable.

## 2023-11-09 NOTE — PROGRESS NOTES
Occupational Therapy                 Therapy Communication Note    Patient Name: Marcelino Oropeza  MRN: [id number]  Today's Date: 11/9/2023     Discipline: Occupational Therapy    Missed Visit Reason: Missed Visit Reason: Patient refused (pt refused OT tx this date, pt stating he isnt feeling up to it and has been getting around just fine, max encouragement given)    Missed Time: Attempt @ 1106    Comment:

## 2023-11-09 NOTE — PROGRESS NOTES
Marcelino Oropeza is a 88 y.o. male on day 3 of admission presenting with Pneumonia of left lung due to infectious organism, unspecified part of lung.      Subjective     Patient is a stable, in no acute distress, complaining of cough with sputum, Tessalon was added  Leukocytosis worsening, patient was on tapering steroid, discontinued on was placed on colchicine  Renal function slowly improving  Patient refused OT  Anticipate discharge within next 24 hours if hemodynamically stable    Objective     Last Recorded Vitals  /60   Pulse 60   Temp 36.6 °C (97.9 °F)   Resp 16   Wt 93.3 kg (205 lb 11 oz)   SpO2 94%   Intake/Output last 3 Shifts:    Intake/Output Summary (Last 24 hours) at 11/9/2023 1253  Last data filed at 11/9/2023 1203  Gross per 24 hour   Intake 1721.09 ml   Output 601 ml   Net 1120.09 ml       Admission Weight  Weight: 90.7 kg (200 lb) (11/06/23 1145)    Daily Weight  11/09/23 : 93.3 kg (205 lb 11 oz)    Image Results  US renal complete  Narrative: Interpreted By:  Mc Bernard,   STUDY:  US RENAL COMPLETE;  11/6/2023 9:10 pm      INDICATION:  Signs/Symptoms:robert.      COMPARISON:  None.      ACCESSION NUMBER(S):  [id number]      ORDERING CLINICIAN:  CASSANDRA STEWART      TECHNIQUE:  Real-time sonographic evaluation of the kidneys and urinary bladder  was performed.      FINDINGS:  RIGHT KIDNEY:  Right kidney measures  7.9 cm.  No shadowing calculus or right  hydronephrosis is visualized.  No discrete renal lesion is visualized.      LEFT KIDNEY:  Left kidney measures  10.2 cm.  No shadowing calculus or left  hydronephrosis is visualized.  A left renal lower pole small ovoid  hypoechoic lesion likely is a small cyst measuring 1 x 0.80.8 cm.      BLADDER:  Urinary bladder is partially distended.      No intraluminal mass or shadowing calculus is demonstrated.      Impression: No hydronephrosis bilaterally.      No focal urinary bladder abnormality identified.      MACRO:  None.      Signed by:  Mc Sfiligoj 11/7/2023 8:32 AM  Dictation workstation:   [id number]      Physical Exam    General: Well-developed male, in no acute distress  HEENT: AT, NC, no JVD, no lymphadenopathy, neck supple  Lungs: no wheezing, no crackles, diffuse rhonchi  Cardiac: Normal S1-S2, no murmur, no gallop  Abdomen: Soft, nontender, no distention, positive bowel sound  Extremities: Bilateral lower extremity 1+ swelling, nonpitting, both feet tender in touch   Neurological: Alert awake oriented x3, sensation intact, clear speech        Assessment/Plan   Principal Problem:    Pneumonia of left lung due to infectious organism, unspecified part of lung    Marcelino Oropeza is a 88 y.o. 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, improving   HFrEF, EF 25-30%  Hx Afib, CAD, HTN, HLD, Arthritis  -cardiac monitor   -Procalcitonin elevated, 0.85, consistent with bacterial pneumonia  -continue Zosyn   -Sputum cx, Strep urine/Legionella antigen, urine cx all negative   -cw colchicine 0.6 BID, add Allopurinol on discharge   -Nephro recs appreciated, stopped IVF hydration, encourage oral intake  -Monitor renal function, avoid nephrotoxic agent  -cw home meds      DVT Ppx: Eliquis  Disposition: PT/OT recommended moderate intensity level on discharge  Anticipate discharge in next 24 hours if hemodynamically stable       German Sullivan MD

## 2023-11-10 ENCOUNTER — OUTSIDE SERVICES (OUTPATIENT)
Dept: INFECTIOUS DISEASES | Age: 88
End: 2023-11-10

## 2023-11-10 ENCOUNTER — APPOINTMENT (OUTPATIENT)
Dept: RADIOLOGY | Facility: HOSPITAL | Age: 88
DRG: 689 | End: 2023-11-10
Payer: MEDICARE

## 2023-11-10 DIAGNOSIS — D72.829 LEUKOCYTOSIS, UNSPECIFIED TYPE: ICD-10-CM

## 2023-11-10 DIAGNOSIS — M10.9 ACUTE GOUT OF LEFT ANKLE, UNSPECIFIED CAUSE: Primary | ICD-10-CM

## 2023-11-10 DIAGNOSIS — N17.9 AKI (ACUTE KIDNEY INJURY) (HCC): ICD-10-CM

## 2023-11-10 DIAGNOSIS — M25.572 ACUTE LEFT ANKLE PAIN: ICD-10-CM

## 2023-11-10 DIAGNOSIS — J18.9 PNEUMONIA DUE TO INFECTIOUS ORGANISM, UNSPECIFIED LATERALITY, UNSPECIFIED PART OF LUNG: ICD-10-CM

## 2023-11-10 LAB
ANION GAP SERPL CALC-SCNC: 11 MMOL/L (ref 10–20)
BACTERIA BLD CULT: NORMAL
BACTERIA BLD CULT: NORMAL
BASOPHILS # BLD AUTO: 0.11 X10*3/UL (ref 0–0.1)
BASOPHILS NFR BLD AUTO: 0.4 %
BUN SERPL-MCNC: 42 MG/DL (ref 6–23)
CALCIUM SERPL-MCNC: 9 MG/DL (ref 8.6–10.3)
CHLORIDE SERPL-SCNC: 110 MMOL/L (ref 98–107)
CO2 SERPL-SCNC: 22 MMOL/L (ref 21–32)
CREAT SERPL-MCNC: 1.69 MG/DL (ref 0.5–1.3)
EOSINOPHIL # BLD AUTO: 0.01 X10*3/UL (ref 0–0.4)
EOSINOPHIL NFR BLD AUTO: 0 %
ERYTHROCYTE [DISTWIDTH] IN BLOOD BY AUTOMATED COUNT: 13.3 % (ref 11.5–14.5)
GFR SERPL CREATININE-BSD FRML MDRD: 39 ML/MIN/1.73M*2
GLUCOSE SERPL-MCNC: 96 MG/DL (ref 74–99)
HCT VFR BLD AUTO: 34.2 % (ref 41–52)
HGB BLD-MCNC: 11.1 G/DL (ref 13.5–17.5)
HOLD SPECIMEN: NORMAL
IMM GRANULOCYTES # BLD AUTO: 0.96 X10*3/UL (ref 0–0.5)
IMM GRANULOCYTES NFR BLD AUTO: 3.9 % (ref 0–0.9)
LYMPHOCYTES # BLD AUTO: 2.45 X10*3/UL (ref 0.8–3)
LYMPHOCYTES NFR BLD AUTO: 10 %
MAGNESIUM SERPL-MCNC: 2.47 MG/DL (ref 1.6–2.4)
MCH RBC QN AUTO: 31.8 PG (ref 26–34)
MCHC RBC AUTO-ENTMCNC: 32.5 G/DL (ref 32–36)
MCV RBC AUTO: 98 FL (ref 80–100)
MONOCYTES # BLD AUTO: 1.57 X10*3/UL (ref 0.05–0.8)
MONOCYTES NFR BLD AUTO: 6.4 %
NEUTROPHILS # BLD AUTO: 19.52 X10*3/UL (ref 1.6–5.5)
NEUTROPHILS NFR BLD AUTO: 79.3 %
NRBC BLD-RTO: 0 /100 WBCS (ref 0–0)
PLATELET # BLD AUTO: 268 X10*3/UL (ref 150–450)
POTASSIUM SERPL-SCNC: 4.5 MMOL/L (ref 3.5–5.3)
RBC # BLD AUTO: 3.49 X10*6/UL (ref 4.5–5.9)
SODIUM SERPL-SCNC: 138 MMOL/L (ref 136–145)
WBC # BLD AUTO: 24.6 X10*3/UL (ref 4.4–11.3)

## 2023-11-10 PROCEDURE — 80048 BASIC METABOLIC PNL TOTAL CA: CPT | Performed by: STUDENT IN AN ORGANIZED HEALTH CARE EDUCATION/TRAINING PROGRAM

## 2023-11-10 PROCEDURE — 83735 ASSAY OF MAGNESIUM: CPT | Performed by: STUDENT IN AN ORGANIZED HEALTH CARE EDUCATION/TRAINING PROGRAM

## 2023-11-10 PROCEDURE — 99232 SBSQ HOSP IP/OBS MODERATE 35: CPT | Performed by: STUDENT IN AN ORGANIZED HEALTH CARE EDUCATION/TRAINING PROGRAM

## 2023-11-10 PROCEDURE — 97530 THERAPEUTIC ACTIVITIES: CPT | Mod: GP,CQ

## 2023-11-10 PROCEDURE — 36415 COLL VENOUS BLD VENIPUNCTURE: CPT | Performed by: STUDENT IN AN ORGANIZED HEALTH CARE EDUCATION/TRAINING PROGRAM

## 2023-11-10 PROCEDURE — 2500000004 HC RX 250 GENERAL PHARMACY W/ HCPCS (ALT 636 FOR OP/ED): Performed by: STUDENT IN AN ORGANIZED HEALTH CARE EDUCATION/TRAINING PROGRAM

## 2023-11-10 PROCEDURE — 2500000001 HC RX 250 WO HCPCS SELF ADMINISTERED DRUGS (ALT 637 FOR MEDICARE OP): Performed by: STUDENT IN AN ORGANIZED HEALTH CARE EDUCATION/TRAINING PROGRAM

## 2023-11-10 PROCEDURE — 71045 X-RAY EXAM CHEST 1 VIEW: CPT | Performed by: RADIOLOGY

## 2023-11-10 PROCEDURE — 85025 COMPLETE CBC W/AUTO DIFF WBC: CPT | Performed by: STUDENT IN AN ORGANIZED HEALTH CARE EDUCATION/TRAINING PROGRAM

## 2023-11-10 PROCEDURE — 1210000001 HC SEMI-PRIVATE ROOM DAILY

## 2023-11-10 PROCEDURE — 71045 X-RAY EXAM CHEST 1 VIEW: CPT | Mod: FY

## 2023-11-10 PROCEDURE — 97535 SELF CARE MNGMENT TRAINING: CPT | Mod: GO,CO

## 2023-11-10 RX ORDER — GUAIFENESIN 100 MG/5ML
200 SOLUTION ORAL EVERY 4 HOURS PRN
Status: DISCONTINUED | OUTPATIENT
Start: 2023-11-10 | End: 2023-11-12 | Stop reason: HOSPADM

## 2023-11-10 RX ADMIN — ATORVASTATIN CALCIUM 40 MG: 20 TABLET, FILM COATED ORAL at 20:58

## 2023-11-10 RX ADMIN — PIPERACILLIN SODIUM AND TAZOBACTAM SODIUM 3.38 G: 3; .375 INJECTION, SOLUTION INTRAVENOUS at 08:31

## 2023-11-10 RX ADMIN — EMPAGLIFLOZIN 12.5 MG: 25 TABLET, FILM COATED ORAL at 08:31

## 2023-11-10 RX ADMIN — COLCHICINE 0.6 MG: 0.6 TABLET ORAL at 20:58

## 2023-11-10 RX ADMIN — PIPERACILLIN SODIUM AND TAZOBACTAM SODIUM 3.38 G: 3; .375 INJECTION, SOLUTION INTRAVENOUS at 17:46

## 2023-11-10 RX ADMIN — GUAIFENESIN 200 MG: 200 SOLUTION ORAL at 20:58

## 2023-11-10 RX ADMIN — TRAMADOL HYDROCHLORIDE 50 MG: 50 TABLET, COATED ORAL at 08:34

## 2023-11-10 RX ADMIN — BENZONATATE 100 MG: 100 CAPSULE ORAL at 20:58

## 2023-11-10 RX ADMIN — METHYLPREDNISOLONE SODIUM SUCCINATE 40 MG: 40 INJECTION INTRAMUSCULAR; INTRAVENOUS at 14:14

## 2023-11-10 RX ADMIN — LEVOTHYROXINE SODIUM 75 MCG: 75 TABLET ORAL at 06:13

## 2023-11-10 RX ADMIN — SODIUM CHLORIDE 10 ML: 9 INJECTION, SOLUTION INTRAVENOUS at 01:55

## 2023-11-10 RX ADMIN — BENZONATATE 100 MG: 100 CAPSULE ORAL at 08:39

## 2023-11-10 RX ADMIN — PIPERACILLIN SODIUM AND TAZOBACTAM SODIUM 3.38 G: 3; .375 INJECTION, SOLUTION INTRAVENOUS at 01:54

## 2023-11-10 RX ADMIN — APIXABAN 2.5 MG: 5 TABLET, FILM COATED ORAL at 08:29

## 2023-11-10 RX ADMIN — SODIUM CHLORIDE 10 ML: 9 INJECTION, SOLUTION INTRAVENOUS at 17:46

## 2023-11-10 RX ADMIN — APIXABAN 2.5 MG: 5 TABLET, FILM COATED ORAL at 20:58

## 2023-11-10 RX ADMIN — Medication 1000 UNITS: at 08:30

## 2023-11-10 RX ADMIN — SODIUM CHLORIDE 10 ML: 9 INJECTION, SOLUTION INTRAVENOUS at 08:31

## 2023-11-10 RX ADMIN — BENZONATATE 100 MG: 100 CAPSULE ORAL at 14:14

## 2023-11-10 RX ADMIN — COLCHICINE 0.6 MG: 0.6 TABLET ORAL at 08:30

## 2023-11-10 RX ADMIN — METOPROLOL SUCCINATE 50 MG: 50 TABLET, EXTENDED RELEASE ORAL at 20:58

## 2023-11-10 ASSESSMENT — COGNITIVE AND FUNCTIONAL STATUS - GENERAL
TOILETING: A LOT
EATING MEALS: A LITTLE
CLIMB 3 TO 5 STEPS WITH RAILING: TOTAL
MOVING TO AND FROM BED TO CHAIR: A LOT
MOBILITY SCORE: 12
TURNING FROM BACK TO SIDE WHILE IN FLAT BAD: A LOT
WALKING IN HOSPITAL ROOM: A LOT
TURNING FROM BACK TO SIDE WHILE IN FLAT BAD: A LOT
CLIMB 3 TO 5 STEPS WITH RAILING: TOTAL
PERSONAL GROOMING: A LOT
DRESSING REGULAR LOWER BODY CLOTHING: A LOT
DAILY ACTIVITIY SCORE: 13
MOVING TO AND FROM BED TO CHAIR: A LOT
HELP NEEDED FOR BATHING: A LOT
STANDING UP FROM CHAIR USING ARMS: A LOT
WALKING IN HOSPITAL ROOM: A LOT
TURNING FROM BACK TO SIDE WHILE IN FLAT BAD: A LITTLE
MOBILITY SCORE: 12
STANDING UP FROM CHAIR USING ARMS: A LOT
MOVING FROM LYING ON BACK TO SITTING ON SIDE OF FLAT BED WITH BEDRAILS: A LITTLE
DRESSING REGULAR LOWER BODY CLOTHING: A LOT
TOILETING: A LOT
MOVING FROM LYING ON BACK TO SITTING ON SIDE OF FLAT BED WITH BEDRAILS: A LITTLE
MOVING TO AND FROM BED TO CHAIR: A LOT
CLIMB 3 TO 5 STEPS WITH RAILING: TOTAL
TOILETING: A LOT
PERSONAL GROOMING: A LOT
STANDING UP FROM CHAIR USING ARMS: A LOT
PERSONAL GROOMING: A LOT
WALKING IN HOSPITAL ROOM: A LOT
DRESSING REGULAR LOWER BODY CLOTHING: A LOT
HELP NEEDED FOR BATHING: A LOT
HELP NEEDED FOR BATHING: A LOT
DAILY ACTIVITIY SCORE: 13
MOBILITY SCORE: 14
DAILY ACTIVITIY SCORE: 14
DRESSING REGULAR UPPER BODY CLOTHING: A LOT
EATING MEALS: A LITTLE

## 2023-11-10 ASSESSMENT — PAIN - FUNCTIONAL ASSESSMENT: PAIN_FUNCTIONAL_ASSESSMENT: 0-10

## 2023-11-10 ASSESSMENT — PAIN SCALES - GENERAL
PAINLEVEL_OUTOF10: 9
PAINLEVEL_OUTOF10: 9

## 2023-11-10 ASSESSMENT — PAIN DESCRIPTION - LOCATION: LOCATION: LEG

## 2023-11-10 ASSESSMENT — ACTIVITIES OF DAILY LIVING (ADL): HOME_MANAGEMENT_TIME_ENTRY: 10

## 2023-11-10 NOTE — CONSULTS
Consults      ID Consult:   HPI    88 y.o. male with pneumonia and left ankle pain secondary to gout flare.  Respiratory cultures negative  Procal 0.85 from 11/7/2023  Quit smoking 60 years ago.   Blood culture negative  Strep and legionella negative    All 14 ROS discussed with the patient and negative other than as stated as above    Uric acid elevated  Past medical history of including gout, HFrEF, and A-fib      has a past surgical history that includes Other surgical history (04/14/2017); Aortic valve replacement (04/14/2017); Other surgical history (04/14/2017); and Coronary artery bypass graft.     reports that he quit smoking about 45 years ago. His smoking use included cigarettes. He started smoking about 65 years ago. He has a 25.00 pack-year smoking history. He has never used smokeless tobacco.    No particular family hx      Physical exam  Vitals:    11/10/23 1500   BP: 119/58   Pulse: 61   Resp: 18   Temp: 36.4 °C (97.5 °F)   SpO2: 97%       Patient is awake and alert - some shortness of breath with exertion. +cough but not productive at this time  NAD  Neck supple  Heart S1S2  Chest: equal expansion. diminished  Abdomen: soft, ND, NTTP, no guarding  Extrem: no pain to palpation, left ankle pain  Skin: no rashes, no diaphoresis  Neuro: CNS intact  Affect appropriate and patient is interactive    CXR:  Persistent irregular opacification of the left base similar to  prior may be due to a combination of atelectasis, infiltrate and  possible small volume pleural effusion.      Assessment:   LLL pneumonia - likely aspiration.   Gout flare bilateral LE particularly L ankle  TOMA on CKD 3  Leukocytosis    Plan:   Started empirically on ceftriaxone and Azithromycin and changed to Zosyn for possible aspiration pneumonia  Patient is on Colchicine for gout flare    Patient is currently on Solumedrol which may explain the increase in leukocytosis  Repeat procal    Estimated Creatinine Clearance: 33 mL/min (A) (by  C-G formula based on SCr of 1.69 mg/dL (H)).      All communication directed to consulting provider.   Thank you very much for this consultation.

## 2023-11-10 NOTE — PROGRESS NOTES
Marcelino Oropeza is a 88 y.o. male on day 4 of admission presenting with Pneumonia of left lung due to infectious organism, unspecified part of lung.      Subjective     Patient is complaining of cough with chest wall pain due to cough, Solu-Medrol was added, ID was consulted for antibiotic management  Had a conversation over phone with the daughter and updated her about the plan  Anticipate discharge within next 24 hours if hemodynamically stable    Objective     Last Recorded Vitals  /68 (BP Location: Left arm, Patient Position: Lying)   Pulse 62   Temp 36.2 °C (97.2 °F) (Temporal)   Resp 17   Wt 94 kg (207 lb 3.7 oz)   SpO2 96%   Intake/Output last 3 Shifts:    Intake/Output Summary (Last 24 hours) at 11/10/2023 1309  Last data filed at 11/10/2023 0554  Gross per 24 hour   Intake 780 ml   Output 1025 ml   Net -245 ml       Admission Weight  Weight: 90.7 kg (200 lb) (11/06/23 1145)    Daily Weight  11/10/23 : 94 kg (207 lb 3.7 oz)    Image Results  US renal complete  Narrative: Interpreted By:  Mc Bernard,   STUDY:  US RENAL COMPLETE;  11/6/2023 9:10 pm      INDICATION:  Signs/Symptoms:robert.      COMPARISON:  None.      ACCESSION NUMBER(S):  [id number]      ORDERING CLINICIAN:  CASSANDRA STEWART      TECHNIQUE:  Real-time sonographic evaluation of the kidneys and urinary bladder  was performed.      FINDINGS:  RIGHT KIDNEY:  Right kidney measures  7.9 cm.  No shadowing calculus or right  hydronephrosis is visualized.  No discrete renal lesion is visualized.      LEFT KIDNEY:  Left kidney measures  10.2 cm.  No shadowing calculus or left  hydronephrosis is visualized.  A left renal lower pole small ovoid  hypoechoic lesion likely is a small cyst measuring 1 x 0.80.8 cm.      BLADDER:  Urinary bladder is partially distended.      No intraluminal mass or shadowing calculus is demonstrated.      Impression: No hydronephrosis bilaterally.      No focal urinary bladder abnormality identified.       MACRO:  None.      Signed by: Mc Bernard 11/7/2023 8:32 AM  Dictation workstation:   [id number]      Physical Exam    General: Well-developed male, in no acute distress  HEENT: AT, NC, no JVD, no lymphadenopathy, neck supple  Lungs: no wheezing, no crackles, diffuse rhonchi  Cardiac: Normal S1-S2, no murmur, no gallop  Abdomen: Soft, nontender, no distention, positive bowel sound  Extremities: Bilateral lower extremity 1+ swelling, nonpitting, both feet tender in touch   Neurological: Alert awake oriented x3, sensation intact, clear speech    Assessment/Plan   Principal Problem:    Pneumonia of left lung due to infectious organism, unspecified part of lung      Marcelino Oropeza is a 88 y.o. 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, improving   HFrEF, EF 25-30%  Hx Afib, CAD, HTN, HLD, Arthritis  -cardiac monitor   -Procalcitonin elevated, 0.85, consistent with bacterial pneumonia  -continue Zosyn   -ID consult for Abx management and worsening leukocytosis   -added solumedrol 40 mg BID today   -Sputum cx, Strep urine/Legionella antigen, urine cx all negative   -cw colchicine 0.6 BID, add Allopurinol on discharge   -Nephro recs appreciated, stopped IVF hydration, encourage oral intake  -Monitor renal function, avoid nephrotoxic agent  -cw home meds      DVT Ppx: Eliquis  Disposition: PT/OT recommended moderate intensity level on discharge  Anticipate discharge in next 24 hours if hemodynamically stable    German Sullivan MD

## 2023-11-10 NOTE — PROGRESS NOTES
Occupational Therapy    OT Treatment    Patient Name: Marcelino Oropeza  MRN: [id number]  Today's Date: 11/10/2023  Time Calculation  Start Time: 0835  Stop Time: 0900  Time Calculation (min): 25 min       Assessment:  Medical Staff Made Aware: Yes  End of Session Communication: Bedside nurse  Medical Staff Made Aware: Yes  Plan:  OT Frequency: 2 times per week     Subjective   General:  OT Received On: 11/10/23  Co-Treatment: PT  Prior to Session Communication: Bedside nurse  Patient Position Received: Bed, 3 rail up, Alarm off, not on at start of session  Precautions:  Medical Precautions: Fall precautions  Vital Signs:     Pain:  Pain Assessment  Pain Assessment: 0-10  Pain Score: 9  Pain Location: Leg  Pain Orientation: Right, Left    Objective    Cognition:  Cognition  Orientation Level: Oriented X4     Activities of Daily Living:             LE Dressing  LE Dressing: Yes  Adult Briefs Level of Assistance:  (patient donned shorts with max a.  pants mgmt while in stance with max a)  LE Dressing Where Assessed: Edge of bed     Functional Standing Tolerance:  Functional Standing Tolerance Comments: patient stood for a total of 2 mins this date with poor/poor- balance with 2-1 ue support at all times.  Bed Mobility/Transfers: Bed Mobility  Bed Mobility: Yes  Bed Mobility 1  Bed Mobility 1: Supine to sitting, Sitting to supine  Level of Assistance 1: Minimum assistance (x2)    Transfers  Transfer: Yes  Transfer 1  Technique 1: Sit to stand  Transfer Device 1: Walker  Transfer Level of Assistance 1: Maximum assistance, +2  Transfers 2  Technique 2:  (side step to HOB)  Transfer Device 2: Walker  Transfer Level of Assistance 2: Maximum assistance, +2    Sitting Balance:  Dynamic Sitting Balance  Dynamic Sitting-Balance:  (fair/fair- balance with 2-0 ue support as needed)     Outcome Measures:[facility] Daily Activity  Putting on and taking off regular lower body clothing: A lot  Bathing (including washing, rinsing, drying): A  lot  Putting on and taking off regular upper body clothing: A lot  Toileting, which includes using toilet, bedpan or urinal: A lot  Taking care of personal grooming such as brushing teeth: A lot  Eating Meals: A little  Daily Activity - Total Score: 13    Education Documentation  ADL Training, taught by TODD Hook at 11/10/2023  1:52 PM.  Learner: Patient  Readiness: Acceptance  Method: Explanation  Response: Verbalizes Understanding    Education Comments  No comments found.      IP EDUCATION:  Education  Individual(s) Educated: Patient  Education Provided: Joint protection and energy conservation, Fall precautons, POC discussed and agreed upon  Equipment:  (AE)  Plan of Care Discussed and Agreed Upon: yes  Patient Response to Education: Patient/Caregiver Verbalized Understanding of Information, Patient/Caregiver Performed Return Demonstration of Exercises/Activities, Patient/Caregiver Asked Appropriate Questions    Goals:  Encounter Problems       Encounter Problems (Active)       OT Goals       UB dress / bathe SBA (Progressing)       Start:  11/07/23    Expected End:  11/21/23            LB dress / bathe CGA (Progressing)       Start:  11/07/23    Expected End:  11/21/23            Pt demo F+/F stand balance for increased independence with toileting, hygiene, and self care tasks.  (Progressing)       Start:  11/07/23    Expected End:  11/21/23            Safe ADL transfers CGA (Progressing)       Start:  11/07/23    Expected End:  11/21/23            Pt tolerate >10 min functional activity tolerance for ADL/IADL without c/o fatigue; apply ECT/WS techniques without cues.  (Progressing)       Start:  11/07/23    Expected End:  11/21/23

## 2023-11-10 NOTE — CARE PLAN
The patient's goals for the shift include pain control    The clinical goals for the shift include safety

## 2023-11-10 NOTE — PROGRESS NOTES
"Nutrition Progress Note  Nutrition Note  Reason for Assessment  Reason for Assessment:  (follow up)    Subjective      Energy Intake: Good > 75 % (about 100% per pt; 75% x1 meal per records)  Food and Nutrient History: Pt reported fair appetite but eating about 100% of his meals. Unable to obtain a very detailed diet history. Pt stated he has been eating less but is unable to say for how long. He reports eating 1 meal per day but is unable to provide details of the foods eaten. He reports drinking boost at home but not recently. He doesn't remember receiving magic cup. Pt requesting boost. Explained magic cup is a better option due to fluid restriction.  Vitamin/Herbal Supplement Use: 25 mcg vitamin D3    Difficulty chewing: denies  Difficulty swallowing: denies    Objective   PMH, meds, and labs reviewed.  Dietary Orders (From admission, onward)       Start     Ordered    11/07/23 1419  Oral nutritional supplements  Until discontinued        Question Answer Comment   Deliver with Lunch    Select supplement: Magic Cup        11/07/23 1419    11/06/23 1805  Adult diet 2-3 grams sodium; 2000 mL fluid  Diet effective now        Question Answer Comment   Diet type 2-3 grams sodium    Dietary fluid restriction / 24h: 2000 mL fluid        11/06/23 1804                  Independent    GI per flowsheet:  Gastrointestinal  Gastrointestinal (WDL): Exceptions to WDL  Abdomen Inspection: Soft, Rounded  Abdominal Tenderness: Nontender  Bowel Sounds: All quadrants  Bowel Sounds (All Quadrants): Hyperactive  Passing Flatus: Yes  Last BM Date: 11/10/23  Bowel Incontinence: Yes  Stool Appearance: Loose, Soft  Stool Color: Brown  Last bowel movement documented: 11/10/23  Allergies: Patient has no known allergies.     Anthropometrics:  Height: 170.2 cm (5' 7\")  Weight: 94 kg (207 lb 3.7 oz)  BMI (Calculated): 32.45  IBW: 67.3 kg           Estimated Nutritional Needs:  Method for Estimating Needs: 9169-7023 (18-20 kcals/kg)    Method " for Estimating Needs: 67-80 (1-1.2 g/kg IBW)    Method for Estimating Needs: 1 mL/kcal or as per MD    Nutrition Focused Physical Findings:   Orbital Fat Pads: Well nourshed (slightly bulging fat pads)  Buccal Fat Pads: Well nourished (full, rounded cheeks)  Triceps: Well nourished (ample fat tissue)  Ribs: Defer    Temporalis: Well nourished (well-defined muscle)  Pectoralis (Clavicular Region): Well nourished (clavicle not visible)  Deltoid/Trapezius: Well nourished (rounded appearance at arm, shoulder, neck)  Interosseous: Well nourished (muscle bulges)  Trapezius/Infraspinatus/Supraspinatus (Scapular Region): Defer  Quadriceps: Defer  Gastrocnemius: Defer    Edema  Edema: +1 trace  Edema Location: generalized, BLE    Skin: Negative  Pain Score: 9     Nutrition Diagnosis   Patient has Malnutrition Diagnosis: No (insufficient data avilable at this time)    Patient has Nutrition Diagnosis: Yes  Resolved (intake about 100% of meals per pt)  Nutrition Diagnosis 1: Predicted inadequate energy intake  Related to (1): acute illness  As Evidenced by (1): report of poor intake PTA, anticipate pt will continue to eat poorly due to illness         Plan    Interventions  Individualized Nutrition Prescription Provided for : 2-3 gm sodium diet, 2000 mL fluid restriction per MD, magic cup once daily (for an additional 290 kcals, 9 gm protein each)     Interventions: Meals and snacks, Medical food supplement  Meals and Snacks: Mineral-modified diet  Goal: intake >75% of meals (goal met)  Medical Food Supplement: Commercial food  Goal: intake >75% of ONS (unsure if meeting, pt doesn't remember getting ONS)      Nutrition Monitoring and Evaluation   Body Composition/Growth/Weight History  Monitoring and Evaluation Plan: Weight  Weight: Measured weight  Criteria: maintain weight, reweigh daily due to CHF  Food/Nutrient Related History Monitoring  Monitoring and Evaluation Plan: Energy intake, Fluid intake, Amount of food  Energy  Intake: Estimated energy intake  Criteria: intake of meals and ONS to meet >75% of estimated nutrition needs  Fluid Intake: Estimated fluid intake  Criteria: intake of fluids to meet >75% of estimated needs  Amount of Food: Estimated amout of food, Medical food intake  Criteria: intake >75% of meals/ONS      Education Documentation  No documentation found.    Pt denied education needs at this time.     Time Spent (min): 30 minutes  Last Date of Nutrition Visit: 11/10/23  Nutrition Follow-Up Needed?: 5-7 days

## 2023-11-10 NOTE — PROGRESS NOTES
Nephrology Progress Note    Assessment:  88 y.o. male with history s/f CAD s/p CABG, HTN, combined CHF (LVEF 25-30%), A.fib, gout, HLD and CKD stage III who presented for LT leg pain for a few days.     TOMA on CKD stage III: possibly due to volume depletion in setting of lasix, lisinopril and aldactone use, also ? If took NSAIDs (states sister gave him 400 mg of pain medications), pt takes jardiance, baseline Scr ~1.5 in 2022, p/w Scr 2.2, CKD risk factors CAD, ?SAGRARIO (LT kidney 10.2, RT 7.9 cm), HTN, ICM, UA w/ ketones, prot, blood and glucose   LT sided PnA  Acute gout flare: uric acid 11, on lasix, losartan  Metabolic acidosis: improving   Anemia      Plan:  - function improving off fluids, continue monitoring   - encourage PO intake   - will eventually benefit from being on allopurinol after flare  - ok to discharge from renal standpoint once medically cleared, should follow up in 2 weeks       Subjective:  Admit Date: 11/6/2023    Interval History: function improving, no acute overnight events     Medications:  Scheduled Meds:apixaban, 2.5 mg, oral, q12h  atorvastatin, 40 mg, oral, Nightly  cholecalciferol, 1,000 Units, oral, Daily  colchicine, 0.6 mg, oral, BID  empagliflozin, 12.5 mg, oral, Daily  levothyroxine, 75 mcg, oral, Daily before breakfast  [Held by provider] losartan, 12.5 mg, oral, Daily  metoprolol succinate XL, 50 mg, oral, Daily  piperacillin-tazobactam, 3.375 g, intravenous, q8h   And  sodium chloride, 10 mL, intravenous, q8h  psyllium, 1 packet, oral, Daily  sennosides, 2 tablet, oral, BID  [Held by provider] spironolactone, 25 mg, oral, Daily      Continuous Infusions:       CBC:   Lab Results   Component Value Date    WBC 24.6 (H) 11/10/2023    RBC 3.49 (L) 11/10/2023    HGB 11.1 (L) 11/10/2023    HCT 34.2 (L) 11/10/2023    MCV 98 11/10/2023    MCH 31.8 11/10/2023    MCHC 32.5 11/10/2023    RDW 13.3 11/10/2023     11/10/2023     BMP:    Lab Results   Component Value Date      "11/10/2023    K 4.5 11/10/2023     (H) 11/10/2023    CO2 22 11/10/2023    BUN 42 (H) 11/10/2023    CREATININE 1.69 (H) 11/10/2023    CALCIUM 9.0 11/10/2023    GLUCOSE 96 11/10/2023       Objective:  Vitals: /68 (BP Location: Left arm, Patient Position: Lying)   Pulse 62   Temp 36.2 °C (97.2 °F) (Temporal)   Resp 17   Ht 1.702 m (5' 7\")   Wt 94 kg (207 lb 3.7 oz)   SpO2 96%   BMI 32.46 kg/m²    Wt Readings from Last 3 Encounters:   11/10/23 94 kg (207 lb 3.7 oz)      24HR INTAKE/OUTPUT:    Intake/Output Summary (Last 24 hours) at 11/10/2023 1221  Last data filed at 11/10/2023 0554  Gross per 24 hour   Intake 780 ml   Output 1025 ml   Net -245 ml         General: alert, in no apparent distress  HEENT: normocephalic, atraumatic, anicteric  Neck: supple, no mass  Lungs: non-labored respirations, clear to auscultation bilaterally  Heart: regular rate and rhythm, no murmurs or rubs  Abdomen: soft, non-tender, non-distended  Ext: no cyanosis, no peripheral edema  Neuro: alert and oriented, no gross abnormalities      Electronically signed by Pooja Carlos MD, MD              "

## 2023-11-10 NOTE — PROGRESS NOTES
Physical Therapy    Physical Therapy Treatment    Patient Name: Marcelino Oropeza  MRN: [id number]  Today's Date: 11/10/2023  Time Calculation  Start Time: 0836  Stop Time: 0900  Time Calculation (min): 24 min       Assessment/Plan   Co-treat with OT to maximize patient and safe safety with transfers.  End of Session Patient Position: Bed, 3 rail up, Alarm on  PT Plan  Inpatient/Swing Bed or Outpatient: Inpatient  PT Plan  Treatment/Interventions: Bed mobility, Transfer training, Gait training, Stair training, Balance training, Strengthening, Endurance training, Therapeutic exercise, Therapeutic activity, Home exercise program  PT Plan: Skilled PT  PT Frequency: 3 times per week  PT Discharge Recommendations: Moderate intensity level of continued care  Equipment Recommended upon Discharge: Wheeled walker  PT Recommended Transfer Status: Assist x2      General Visit Information:   PT  Visit  PT Received On: 11/10/23  General  Family/Caregiver Present: No  Prior to Session Communication: Bedside nurse  Patient Position Received: Bed, 3 rail up, Alarm on  General Comment: Encouragement needed for participation. Educated patient on the benefits of participating in therapy activities.    General Observations:   General Observation: External male cather    Subjective     Precautions:  Precautions  Medical Precautions: Fall precautions    Objective     Pain:  Pain Assessment  Pain Assessment:  (Patient rates (B)LE pain at 8-9/10. (R)LE worse than (L)LE. Knees worse than ankles. Nursing aware and states patient was medicated prior to therapy session.)      Treatments:  Bed Mobility  Bed Mobility: Yes  Bed Mobility 1  Bed Mobility 1: Supine to sitting, Sitting to supine  Level of Assistance 1: Minimum assistance (x2)  Bed Mobility Comments 1: Hand over hand (A) to reach across body to use the bed rail for support. (A) to lift UB from HOB while moving LEs over the EOB.(A) to support UB while lifting LEs onto the  bed.  Ambulation/Gait Training  Ambulation/Gait Training Performed: Yes  Ambulation/Gait Training 1  Surface 1: Level tile  Device 1: Rolling walker  Assistance 1: Maximum assistance (x2)  Comments/Distance (ft) 1: ~3ft x1, side stepping towards HOB. WBOS. Slow dontrell. Forward flexed posture. Decreased step height/length B. v/c and (A) for safer advancement of ww. Patient did not want to sit in chair; returned to bed.  Transfers  Transfer: Yes  Transfer 1  Technique 1: Sit to stand, Stand to sit  Transfer Device 1:  (ww)  Transfer Level of Assistance 1: Maximum assistance (x2)  Trials/Comments 1: (2x). v/c for safe hand placement and technique. Slow transition of hands to/from ww.          Outcome Measures:  [facility] Basic Mobility  Turning from your back to your side while in a flat bed without using bedrails: A little  Moving from lying on your back to sitting on the side of a flat bed without using bedrails: A lot  Moving to and from bed to chair (including a wheelchair): A lot  Standing up from a chair using your arms (e.g. wheelchair or bedside chair): A lot  To walk in hospital room: A lot  Climbing 3-5 steps with railing: Total  Basic Mobility - Total Score: 12    Education Documentation  Mobility Training, taught by Tammie Mendoza PTA at 11/10/2023  2:49 PM.  Learner: Patient  Readiness: Acceptance  Method: Explanation, Demonstration  Response: Verbalizes Understanding, Needs Reinforcement  Comment: See therapy note.      EDUCATION:  Education  Individual(s) Educated: Patient  Education Provided: Fall Risk, POC (Safety with bed mobility/transfers.)    Encounter Problems       Encounter Problems (Active)       PT Problem       Pt will demonstrate sba with bed mobility to edge of bed.   (Progressing)       Start:  11/07/23    Expected End:  11/21/23            Pt will demonstrate SBA with sit to stand/chair transfers with FWW.   (Progressing)       Start:  11/07/23    Expected End:  11/21/23            Pt will  ambulate 30 feet with FWW SBA.   (Progressing)       Start:  11/07/23    Expected End:  11/21/23            Pt to demo improved BLE strength by being able to complete supine/seated thera ex 2x20 BLEs with 4 or less rest breaks .   (Progressing)       Start:  11/07/23    Expected End:  11/21/23               Pain - Adult

## 2023-11-11 LAB
ANION GAP SERPL CALC-SCNC: 15 MMOL/L (ref 10–20)
BASOPHILS # BLD AUTO: 0.07 X10*3/UL (ref 0–0.1)
BASOPHILS NFR BLD AUTO: 0.3 %
BUN SERPL-MCNC: 39 MG/DL (ref 6–23)
C DIF TOX TCDA+TCDB STL QL NAA+PROBE: NOT DETECTED
CALCIUM SERPL-MCNC: 8.8 MG/DL (ref 8.6–10.3)
CHLORIDE SERPL-SCNC: 109 MMOL/L (ref 98–107)
CO2 SERPL-SCNC: 20 MMOL/L (ref 21–32)
CREAT SERPL-MCNC: 1.6 MG/DL (ref 0.5–1.3)
EOSINOPHIL # BLD AUTO: 0 X10*3/UL (ref 0–0.4)
EOSINOPHIL NFR BLD AUTO: 0 %
ERYTHROCYTE [DISTWIDTH] IN BLOOD BY AUTOMATED COUNT: 13.4 % (ref 11.5–14.5)
GFR SERPL CREATININE-BSD FRML MDRD: 41 ML/MIN/1.73M*2
GLUCOSE SERPL-MCNC: 147 MG/DL (ref 74–99)
HCT VFR BLD AUTO: 36.5 % (ref 41–52)
HGB BLD-MCNC: 12 G/DL (ref 13.5–17.5)
IMM GRANULOCYTES # BLD AUTO: 0.88 X10*3/UL (ref 0–0.5)
IMM GRANULOCYTES NFR BLD AUTO: 3.8 % (ref 0–0.9)
LYMPHOCYTES # BLD AUTO: 1.17 X10*3/UL (ref 0.8–3)
LYMPHOCYTES NFR BLD AUTO: 5 %
MAGNESIUM SERPL-MCNC: 2.45 MG/DL (ref 1.6–2.4)
MCH RBC QN AUTO: 32 PG (ref 26–34)
MCHC RBC AUTO-ENTMCNC: 32.9 G/DL (ref 32–36)
MCV RBC AUTO: 97 FL (ref 80–100)
MONOCYTES # BLD AUTO: 0.29 X10*3/UL (ref 0.05–0.8)
MONOCYTES NFR BLD AUTO: 1.3 %
NEUTROPHILS # BLD AUTO: 20.78 X10*3/UL (ref 1.6–5.5)
NEUTROPHILS NFR BLD AUTO: 89.6 %
NRBC BLD-RTO: 0 /100 WBCS (ref 0–0)
PLATELET # BLD AUTO: 285 X10*3/UL (ref 150–450)
POTASSIUM SERPL-SCNC: 4.7 MMOL/L (ref 3.5–5.3)
PROCALCITONIN SERPL-MCNC: 0.22 NG/ML
RBC # BLD AUTO: 3.75 X10*6/UL (ref 4.5–5.9)
SODIUM SERPL-SCNC: 139 MMOL/L (ref 136–145)
WBC # BLD AUTO: 23.2 X10*3/UL (ref 4.4–11.3)

## 2023-11-11 PROCEDURE — 87493 C DIFF AMPLIFIED PROBE: CPT | Performed by: STUDENT IN AN ORGANIZED HEALTH CARE EDUCATION/TRAINING PROGRAM

## 2023-11-11 PROCEDURE — 36415 COLL VENOUS BLD VENIPUNCTURE: CPT | Performed by: INTERNAL MEDICINE

## 2023-11-11 PROCEDURE — 2500000001 HC RX 250 WO HCPCS SELF ADMINISTERED DRUGS (ALT 637 FOR MEDICARE OP): Performed by: STUDENT IN AN ORGANIZED HEALTH CARE EDUCATION/TRAINING PROGRAM

## 2023-11-11 PROCEDURE — 36415 COLL VENOUS BLD VENIPUNCTURE: CPT | Performed by: STUDENT IN AN ORGANIZED HEALTH CARE EDUCATION/TRAINING PROGRAM

## 2023-11-11 PROCEDURE — 83735 ASSAY OF MAGNESIUM: CPT | Performed by: STUDENT IN AN ORGANIZED HEALTH CARE EDUCATION/TRAINING PROGRAM

## 2023-11-11 PROCEDURE — 84145 PROCALCITONIN (PCT): CPT | Mod: ELYLAB | Performed by: INTERNAL MEDICINE

## 2023-11-11 PROCEDURE — 2500000004 HC RX 250 GENERAL PHARMACY W/ HCPCS (ALT 636 FOR OP/ED): Performed by: STUDENT IN AN ORGANIZED HEALTH CARE EDUCATION/TRAINING PROGRAM

## 2023-11-11 PROCEDURE — 99239 HOSP IP/OBS DSCHRG MGMT >30: CPT | Performed by: STUDENT IN AN ORGANIZED HEALTH CARE EDUCATION/TRAINING PROGRAM

## 2023-11-11 PROCEDURE — 80048 BASIC METABOLIC PNL TOTAL CA: CPT | Performed by: STUDENT IN AN ORGANIZED HEALTH CARE EDUCATION/TRAINING PROGRAM

## 2023-11-11 PROCEDURE — 1210000001 HC SEMI-PRIVATE ROOM DAILY

## 2023-11-11 PROCEDURE — 85025 COMPLETE CBC W/AUTO DIFF WBC: CPT | Performed by: STUDENT IN AN ORGANIZED HEALTH CARE EDUCATION/TRAINING PROGRAM

## 2023-11-11 RX ORDER — COLCHICINE 0.6 MG/1
0.6 TABLET ORAL DAILY
Qty: 30 TABLET | Refills: 0 | Status: SHIPPED | OUTPATIENT
Start: 2023-11-11 | End: 2023-12-11

## 2023-11-11 RX ORDER — ALLOPURINOL 100 MG/1
100 TABLET ORAL DAILY
Qty: 60 TABLET | Refills: 3 | Status: SHIPPED | OUTPATIENT
Start: 2023-11-11 | End: 2024-07-08

## 2023-11-11 RX ORDER — PREDNISONE 20 MG/1
20 TABLET ORAL DAILY
Status: SHIPPED | OUTPATIENT
Start: 2023-11-11 | End: 2023-11-18

## 2023-11-11 RX ORDER — LEVOFLOXACIN 500 MG/1
500 TABLET, FILM COATED ORAL DAILY
Qty: 5 TABLET | Refills: 0 | Status: SHIPPED | OUTPATIENT
Start: 2023-11-11 | End: 2023-11-16

## 2023-11-11 RX ORDER — LOPERAMIDE HYDROCHLORIDE 2 MG/1
2 CAPSULE ORAL 4 TIMES DAILY PRN
Status: DISCONTINUED | OUTPATIENT
Start: 2023-11-11 | End: 2023-11-12 | Stop reason: HOSPADM

## 2023-11-11 RX ORDER — BENZONATATE 100 MG/1
100 CAPSULE ORAL 3 TIMES DAILY PRN
Qty: 20 CAPSULE | Refills: 0 | Status: SHIPPED | OUTPATIENT
Start: 2023-11-11

## 2023-11-11 RX ORDER — GUAIFENESIN 600 MG/1
600 TABLET, EXTENDED RELEASE ORAL 2 TIMES DAILY PRN
Qty: 20 TABLET | Refills: 0 | Status: SHIPPED | OUTPATIENT
Start: 2023-11-11 | End: 2023-11-21

## 2023-11-11 RX ORDER — GUAIFENESIN 100 MG/5ML
200 SOLUTION ORAL EVERY 4 HOURS PRN
Qty: 120 ML | Refills: 0 | Status: SHIPPED | OUTPATIENT
Start: 2023-11-11

## 2023-11-11 RX ADMIN — COLCHICINE 0.6 MG: 0.6 TABLET ORAL at 20:08

## 2023-11-11 RX ADMIN — PIPERACILLIN SODIUM AND TAZOBACTAM SODIUM 3.38 G: 3; .375 INJECTION, SOLUTION INTRAVENOUS at 01:22

## 2023-11-11 RX ADMIN — LEVOTHYROXINE SODIUM 75 MCG: 75 TABLET ORAL at 06:31

## 2023-11-11 RX ADMIN — GUAIFENESIN 600 MG: 600 TABLET, EXTENDED RELEASE ORAL at 09:33

## 2023-11-11 RX ADMIN — SODIUM CHLORIDE 10 ML: 9 INJECTION, SOLUTION INTRAVENOUS at 17:00

## 2023-11-11 RX ADMIN — ATORVASTATIN CALCIUM 40 MG: 20 TABLET, FILM COATED ORAL at 20:08

## 2023-11-11 RX ADMIN — LOPERAMIDE HYDROCHLORIDE 2 MG: 2 CAPSULE ORAL at 17:40

## 2023-11-11 RX ADMIN — BENZONATATE 100 MG: 100 CAPSULE ORAL at 20:08

## 2023-11-11 RX ADMIN — APIXABAN 2.5 MG: 5 TABLET, FILM COATED ORAL at 20:08

## 2023-11-11 RX ADMIN — COLCHICINE 0.6 MG: 0.6 TABLET ORAL at 09:26

## 2023-11-11 RX ADMIN — GUAIFENESIN 200 MG: 200 SOLUTION ORAL at 20:08

## 2023-11-11 RX ADMIN — METHYLPREDNISOLONE SODIUM SUCCINATE 40 MG: 40 INJECTION INTRAMUSCULAR; INTRAVENOUS at 01:22

## 2023-11-11 RX ADMIN — METHYLPREDNISOLONE SODIUM SUCCINATE 40 MG: 40 INJECTION INTRAMUSCULAR; INTRAVENOUS at 12:57

## 2023-11-11 RX ADMIN — BENZONATATE 100 MG: 100 CAPSULE ORAL at 09:33

## 2023-11-11 RX ADMIN — PIPERACILLIN SODIUM AND TAZOBACTAM SODIUM 3.38 G: 3; .375 INJECTION, SOLUTION INTRAVENOUS at 17:39

## 2023-11-11 RX ADMIN — METOPROLOL SUCCINATE 50 MG: 50 TABLET, EXTENDED RELEASE ORAL at 20:08

## 2023-11-11 RX ADMIN — APIXABAN 2.5 MG: 5 TABLET, FILM COATED ORAL at 09:28

## 2023-11-11 RX ADMIN — EMPAGLIFLOZIN 12.5 MG: 25 TABLET, FILM COATED ORAL at 09:27

## 2023-11-11 RX ADMIN — SODIUM CHLORIDE 10 ML: 9 INJECTION, SOLUTION INTRAVENOUS at 09:00

## 2023-11-11 RX ADMIN — Medication 1000 UNITS: at 09:26

## 2023-11-11 RX ADMIN — PIPERACILLIN SODIUM AND TAZOBACTAM SODIUM 3.38 G: 3; .375 INJECTION, SOLUTION INTRAVENOUS at 09:29

## 2023-11-11 RX ADMIN — GUAIFENESIN 200 MG: 200 SOLUTION ORAL at 09:33

## 2023-11-11 ASSESSMENT — COGNITIVE AND FUNCTIONAL STATUS - GENERAL
WALKING IN HOSPITAL ROOM: A LOT
TOILETING: A LOT
TURNING FROM BACK TO SIDE WHILE IN FLAT BAD: A LITTLE
DRESSING REGULAR LOWER BODY CLOTHING: A LOT
DAILY ACTIVITIY SCORE: 14
MOBILITY SCORE: 14
HELP NEEDED FOR BATHING: A LOT
MOVING TO AND FROM BED TO CHAIR: A LOT
PERSONAL GROOMING: A LOT
CLIMB 3 TO 5 STEPS WITH RAILING: TOTAL
DRESSING REGULAR UPPER BODY CLOTHING: A LOT
STANDING UP FROM CHAIR USING ARMS: A LOT

## 2023-11-11 ASSESSMENT — PAIN SCALES - GENERAL: PAINLEVEL_OUTOF10: 0 - NO PAIN

## 2023-11-11 ASSESSMENT — PAIN - FUNCTIONAL ASSESSMENT: PAIN_FUNCTIONAL_ASSESSMENT: 0-10

## 2023-11-11 NOTE — PROGRESS NOTES
Nephrology Progress Note    Assessment:  88 y.o. male with history s/f CAD s/p CABG, HTN, combined CHF (LVEF 25-30%), A.fib, gout, HLD and CKD stage III who presented for LT leg pain for a few days.     TOMA on CKD stage III: possibly due to volume depletion in setting of lasix, lisinopril and aldactone use, also ? If took NSAIDs (states sister gave him 400 mg of pain medications), pt takes jardiance, baseline Scr ~1.5 in 2022, p/w Scr 2.2, CKD risk factors CAD, ?SAGRARIO (LT kidney 10.2, RT 7.9 cm), HTN, ICM, UA w/ ketones, prot, blood and glucose   LT sided PnA  Acute gout flare: uric acid 11, on lasix, losartan  Metabolic acidosis: improving   Anemia      Plan:  - function continues to improve, no changes to current management   - ok to discharge from renal standpoint once medically cleared, should follow up in 3 weeks       Subjective:  Admit Date: 11/6/2023    Interval History: function improving, no acute overnight events, c/o abdominal pain w/ coughing     Medications:  Scheduled Meds:apixaban, 2.5 mg, oral, q12h  atorvastatin, 40 mg, oral, Nightly  cholecalciferol, 1,000 Units, oral, Daily  colchicine, 0.6 mg, oral, BID  empagliflozin, 12.5 mg, oral, Daily  levothyroxine, 75 mcg, oral, Daily before breakfast  [Held by provider] losartan, 12.5 mg, oral, Daily  methylPREDNISolone sodium succinate (PF), 40 mg, intravenous, q12h  metoprolol succinate XL, 50 mg, oral, Daily  piperacillin-tazobactam, 3.375 g, intravenous, q8h   And  sodium chloride, 10 mL, intravenous, q8h  psyllium, 1 packet, oral, Daily  sennosides, 2 tablet, oral, BID  [Held by provider] spironolactone, 25 mg, oral, Daily      Continuous Infusions:       CBC:   Lab Results   Component Value Date    WBC 23.2 (H) 11/11/2023    RBC 3.75 (L) 11/11/2023    HGB 12.0 (L) 11/11/2023    HCT 36.5 (L) 11/11/2023    MCV 97 11/11/2023    MCH 32.0 11/11/2023    MCHC 32.9 11/11/2023    RDW 13.4 11/11/2023     11/11/2023     BMP:    Lab Results   Component  "Value Date     11/11/2023    K 4.7 11/11/2023     (H) 11/11/2023    CO2 20 (L) 11/11/2023    BUN 39 (H) 11/11/2023    CREATININE 1.60 (H) 11/11/2023    CALCIUM 8.8 11/11/2023    GLUCOSE 147 (H) 11/11/2023       Objective:  Vitals: /62 (BP Location: Left arm, Patient Position: Lying)   Pulse 58   Temp 36.1 °C (97 °F) (Temporal)   Resp 18   Ht 1.702 m (5' 7\")   Wt 94 kg (207 lb 3.7 oz)   SpO2 97%   BMI 32.46 kg/m²    Wt Readings from Last 3 Encounters:   11/10/23 94 kg (207 lb 3.7 oz)      24HR INTAKE/OUTPUT:    Intake/Output Summary (Last 24 hours) at 11/11/2023 1121  Last data filed at 11/11/2023 0522  Gross per 24 hour   Intake 120 ml   Output 700 ml   Net -580 ml         General: alert, in no apparent distress  HEENT: normocephalic, atraumatic, anicteric  Lungs: non-labored respirations, clear to auscultation bilaterally  Heart: regular rate and rhythm, no murmurs or rubs  Abdomen: soft, non-tender, non-distended  Ext: no cyanosis, no peripheral edema  Neuro: alert and oriented, no gross abnormalities      Electronically signed by Pooja Carlos MD, MD              "

## 2023-11-11 NOTE — PROGRESS NOTES
11/11/23 1547   Discharge Planning   Home or Post Acute Services Post acute facilities (Rehab/SNF/etc)   Type of Post Acute Facility Services Skilled nursing   Patient expects to be discharged to: [facility]   Does the patient need discharge transport arranged? Yes   RoundTrip coordination needed? Yes   Has discharge transport been arranged? Yes   What day is the transport expected? 11/12/23   What time is the transport expected? 0900   Patient Choice   Provider Choice list and CMS website (https://medicare.gov/care-compare#search) for post-acute Quality and Resource Measure Data were provided and reviewed with: Patient;Family   Patient / Family choosing to utilize agency / facility established prior to hospitalization No     Pt cleared for dc. Requested transport for this evening but unable to be set up d/t ambulance service being backed up this evening. Transport set up for 9am tomorrow, 11/12. Pt, family, nurse, SNF, and MD updated. Final orders sent. Will need 7000 prior to dc.

## 2023-11-11 NOTE — DISCHARGE SUMMARY
Discharge Diagnosis  Pneumonia of left lung due to infectious organism, unspecified part of lung    Issues Requiring Follow-Up  None     Discharge Meds     Your medication list        START taking these medications        Instructions Last Dose Given Next Dose Due   allopurinol 100 mg tablet  Commonly known as: Zyloprim      Take 1 tablet (100 mg) by mouth once daily.       benzonatate 100 mg capsule  Commonly known as: Tessalon      Take 1 capsule (100 mg) by mouth 3 times a day as needed for cough. Do not crush or chew.       colchicine 0.6 mg tablet      Take 1 tablet (0.6 mg) by mouth once daily.       guaiFENesin 600 mg 12 hr tablet  Commonly known as: Mucinex      Take 1 tablet (600 mg) by mouth 2 times a day as needed for cough for up to 10 days. Do not crush, chew, or split.       guaiFENesin 100 mg/5 mL syrup  Commonly known as: Robitussin      Take 10 mL (200 mg) by mouth every 4 hours if needed for congestion.       levoFLOXacin 500 mg tablet  Commonly known as: Levaquin      Take 1 tablet (500 mg) by mouth once daily for 5 days.              CONTINUE taking these medications        Instructions Last Dose Given Next Dose Due   atorvastatin 40 mg tablet  Commonly known as: Lipitor           cholecalciferol 25 MCG (1000 UT) tablet  Commonly known as: Vitamin D-3           Eliquis 2.5 mg tablet  Generic drug: apixaban           empagliflozin 25 mg  Commonly known as: Jardiance           furosemide 40 mg tablet  Commonly known as: Lasix           levothyroxine 75 mcg tablet  Commonly known as: Synthroid, Levoxyl           losartan 25 mg tablet  Commonly known as: Cozaar           metoprolol succinate XL 50 mg 24 hr tablet  Commonly known as: Toprol-XL           nitroglycerin 0.4 mg SL tablet  Commonly known as: Nitrostat           spironolactone 25 mg tablet  Commonly known as: Aldactone                     Where to Get Your Medications        These medications were sent to [facility] - [place], [place] -  [address]  [address] , [facility] [address]      Phone: [phone]   allopurinol 100 mg tablet  benzonatate 100 mg capsule  colchicine 0.6 mg tablet  guaiFENesin 100 mg/5 mL syrup  guaiFENesin 600 mg 12 hr tablet  levoFLOXacin 500 mg tablet         Test Results Pending At Discharge  Pending Labs       No current pending labs.            Hospital Course  Marcelino Oropeza is a 88 y.o. 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, improving   HFrEF, EF 25-30%  Hx Afib, CAD, HTN, HLD, Arthritis  Sputum cx, Strep urine/Legionella antigen, urine cx all negative  Pt was placed on cardiac monitor, Zosyn, solumedrol 40 mg BID,   ID consulted for Abx management and worsening leukocytosis and recommended to continue with same management   Was placed on colchicine 0.6 BID, decreased to 0.6 mg daily and added Allopurinol on discharge   Nephro consulted for TOMA, stopped IVF hydration, encourage oral intake  Monitored renal function, avoided nephrotoxic agent  Continued with home meds during the hospital stay    DVT Ppx: was with Eliquis  Disposition: PT/OT recommended moderate intensity level on discharge    Within the past 24 hours pt has been hemodynamically and medically stable and ready for discharge to SNF for better recovery.  He will be continued on oral antibiotic to complete pneumonia treatment.  I will continue colchicine 0.6 mg daily and allopurinol on discharge.  He will continue on the rest of his home medication.  I also added prednisone 20 mg daily for another 7 days.  Patient will follow-up as outpatient with his primary care as needed for further management and medication adjustment.    Pertinent Physical Exam At Time of Discharge  Physical Exam    General: Well-developed  male, in no acute distress  HEENT: AT, NC, no JVD, no lymphadenopathy, neck supple  Lungs: no wheezing, no crackles  Cardiac: Normal S1-S2, no murmur, no gallop  Abdomen: Soft, nontender, no distention, positive bowel sound  Extremities: Bilateral lower extremity 1+ swelling, nonpitting  Neurological: Alert awake oriented x3, sensation intact, clear speech      Time spent 45 minutes  German Sullivan MD

## 2023-11-12 ENCOUNTER — APPOINTMENT (OUTPATIENT)
Dept: RADIOLOGY | Facility: HOSPITAL | Age: 88
DRG: 689 | End: 2023-11-12
Payer: MEDICARE

## 2023-11-12 VITALS
RESPIRATION RATE: 18 BRPM | DIASTOLIC BLOOD PRESSURE: 64 MMHG | OXYGEN SATURATION: 97 % | SYSTOLIC BLOOD PRESSURE: 138 MMHG | HEIGHT: 67 IN | BODY MASS INDEX: 32.53 KG/M2 | HEART RATE: 70 BPM | TEMPERATURE: 98.1 F | WEIGHT: 207.23 LBS

## 2023-11-12 LAB
ANION GAP SERPL CALC-SCNC: 14 MMOL/L (ref 10–20)
APPEARANCE UR: CLEAR
BASOPHILS # BLD AUTO: 0.11 X10*3/UL (ref 0–0.1)
BASOPHILS NFR BLD AUTO: 0.4 %
BILIRUB UR STRIP.AUTO-MCNC: NEGATIVE MG/DL
BUN SERPL-MCNC: 45 MG/DL (ref 6–23)
CALCIUM SERPL-MCNC: 8.6 MG/DL (ref 8.6–10.3)
CHLORIDE SERPL-SCNC: 113 MMOL/L (ref 98–107)
CO2 SERPL-SCNC: 16 MMOL/L (ref 21–32)
COLOR UR: YELLOW
CREAT SERPL-MCNC: 1.68 MG/DL (ref 0.5–1.3)
EOSINOPHIL # BLD AUTO: 0 X10*3/UL (ref 0–0.4)
EOSINOPHIL NFR BLD AUTO: 0 %
ERYTHROCYTE [DISTWIDTH] IN BLOOD BY AUTOMATED COUNT: 13.4 % (ref 11.5–14.5)
GFR SERPL CREATININE-BSD FRML MDRD: 39 ML/MIN/1.73M*2
GLUCOSE SERPL-MCNC: 142 MG/DL (ref 74–99)
GLUCOSE UR STRIP.AUTO-MCNC: ABNORMAL MG/DL
HCT VFR BLD AUTO: 40.1 % (ref 41–52)
HGB BLD-MCNC: 12.5 G/DL (ref 13.5–17.5)
IMM GRANULOCYTES # BLD AUTO: 0.89 X10*3/UL (ref 0–0.5)
IMM GRANULOCYTES NFR BLD AUTO: 3.3 % (ref 0–0.9)
KETONES UR STRIP.AUTO-MCNC: NEGATIVE MG/DL
LEUKOCYTE ESTERASE UR QL STRIP.AUTO: NEGATIVE
LYMPHOCYTES # BLD AUTO: 1.57 X10*3/UL (ref 0.8–3)
LYMPHOCYTES NFR BLD AUTO: 5.8 %
MAGNESIUM SERPL-MCNC: 2.61 MG/DL (ref 1.6–2.4)
MCH RBC QN AUTO: 32.1 PG (ref 26–34)
MCHC RBC AUTO-ENTMCNC: 31.2 G/DL (ref 32–36)
MCV RBC AUTO: 103 FL (ref 80–100)
MONOCYTES # BLD AUTO: 0.48 X10*3/UL (ref 0.05–0.8)
MONOCYTES NFR BLD AUTO: 1.8 %
NEUTROPHILS # BLD AUTO: 24.14 X10*3/UL (ref 1.6–5.5)
NEUTROPHILS NFR BLD AUTO: 88.7 %
NITRITE UR QL STRIP.AUTO: NEGATIVE
NRBC BLD-RTO: 0 /100 WBCS (ref 0–0)
PH UR STRIP.AUTO: 5 [PH]
PLATELET # BLD AUTO: 292 X10*3/UL (ref 150–450)
POTASSIUM SERPL-SCNC: 4.3 MMOL/L (ref 3.5–5.3)
PROT UR STRIP.AUTO-MCNC: NEGATIVE MG/DL
RBC # BLD AUTO: 3.9 X10*6/UL (ref 4.5–5.9)
RBC # UR STRIP.AUTO: NEGATIVE /UL
SODIUM SERPL-SCNC: 139 MMOL/L (ref 136–145)
SP GR UR STRIP.AUTO: 1.03
UROBILINOGEN UR STRIP.AUTO-MCNC: <2 MG/DL
WBC # BLD AUTO: 27.2 X10*3/UL (ref 4.4–11.3)

## 2023-11-12 PROCEDURE — 99232 SBSQ HOSP IP/OBS MODERATE 35: CPT | Performed by: STUDENT IN AN ORGANIZED HEALTH CARE EDUCATION/TRAINING PROGRAM

## 2023-11-12 PROCEDURE — 71045 X-RAY EXAM CHEST 1 VIEW: CPT | Mod: FY

## 2023-11-12 PROCEDURE — 2500000004 HC RX 250 GENERAL PHARMACY W/ HCPCS (ALT 636 FOR OP/ED): Performed by: STUDENT IN AN ORGANIZED HEALTH CARE EDUCATION/TRAINING PROGRAM

## 2023-11-12 PROCEDURE — 80048 BASIC METABOLIC PNL TOTAL CA: CPT | Performed by: STUDENT IN AN ORGANIZED HEALTH CARE EDUCATION/TRAINING PROGRAM

## 2023-11-12 PROCEDURE — 83735 ASSAY OF MAGNESIUM: CPT | Performed by: STUDENT IN AN ORGANIZED HEALTH CARE EDUCATION/TRAINING PROGRAM

## 2023-11-12 PROCEDURE — 81003 URINALYSIS AUTO W/O SCOPE: CPT | Performed by: STUDENT IN AN ORGANIZED HEALTH CARE EDUCATION/TRAINING PROGRAM

## 2023-11-12 PROCEDURE — 2500000001 HC RX 250 WO HCPCS SELF ADMINISTERED DRUGS (ALT 637 FOR MEDICARE OP): Performed by: STUDENT IN AN ORGANIZED HEALTH CARE EDUCATION/TRAINING PROGRAM

## 2023-11-12 PROCEDURE — 85025 COMPLETE CBC W/AUTO DIFF WBC: CPT | Performed by: STUDENT IN AN ORGANIZED HEALTH CARE EDUCATION/TRAINING PROGRAM

## 2023-11-12 PROCEDURE — 71045 X-RAY EXAM CHEST 1 VIEW: CPT | Performed by: RADIOLOGY

## 2023-11-12 PROCEDURE — 36415 COLL VENOUS BLD VENIPUNCTURE: CPT | Performed by: STUDENT IN AN ORGANIZED HEALTH CARE EDUCATION/TRAINING PROGRAM

## 2023-11-12 RX ORDER — COLCHICINE 0.6 MG/1
0.6 TABLET ORAL DAILY
Status: DISCONTINUED | OUTPATIENT
Start: 2023-11-13 | End: 2023-11-12 | Stop reason: HOSPADM

## 2023-11-12 RX ORDER — LOPERAMIDE HYDROCHLORIDE 2 MG/1
2 CAPSULE ORAL 4 TIMES DAILY PRN
Qty: 30 CAPSULE | Refills: 0 | Status: SHIPPED | OUTPATIENT
Start: 2023-11-12

## 2023-11-12 RX ADMIN — COLCHICINE 0.6 MG: 0.6 TABLET ORAL at 08:56

## 2023-11-12 RX ADMIN — APIXABAN 2.5 MG: 5 TABLET, FILM COATED ORAL at 08:58

## 2023-11-12 RX ADMIN — METHYLPREDNISOLONE SODIUM SUCCINATE 40 MG: 40 INJECTION INTRAMUSCULAR; INTRAVENOUS at 00:38

## 2023-11-12 RX ADMIN — LOPERAMIDE HYDROCHLORIDE 2 MG: 2 CAPSULE ORAL at 09:05

## 2023-11-12 RX ADMIN — SODIUM CHLORIDE 10 ML: 9 INJECTION, SOLUTION INTRAVENOUS at 16:49

## 2023-11-12 RX ADMIN — SODIUM CHLORIDE 10 ML: 9 INJECTION, SOLUTION INTRAVENOUS at 08:58

## 2023-11-12 RX ADMIN — PIPERACILLIN SODIUM AND TAZOBACTAM SODIUM 3.38 G: 3; .375 INJECTION, SOLUTION INTRAVENOUS at 16:49

## 2023-11-12 RX ADMIN — GUAIFENESIN 200 MG: 200 SOLUTION ORAL at 09:01

## 2023-11-12 RX ADMIN — BENZONATATE 100 MG: 100 CAPSULE ORAL at 09:00

## 2023-11-12 RX ADMIN — EMPAGLIFLOZIN 12.5 MG: 25 TABLET, FILM COATED ORAL at 08:56

## 2023-11-12 RX ADMIN — Medication 1000 UNITS: at 08:57

## 2023-11-12 RX ADMIN — LEVOTHYROXINE SODIUM 75 MCG: 75 TABLET ORAL at 05:45

## 2023-11-12 RX ADMIN — PIPERACILLIN SODIUM AND TAZOBACTAM SODIUM 3.38 G: 3; .375 INJECTION, SOLUTION INTRAVENOUS at 08:58

## 2023-11-12 RX ADMIN — PIPERACILLIN SODIUM AND TAZOBACTAM SODIUM 3.38 G: 3; .375 INJECTION, SOLUTION INTRAVENOUS at 00:38

## 2023-11-12 NOTE — PROGRESS NOTES
Per Dr Sullivan, pt WBC was elevated this morning. Requested to postpone transport until later today. Updated pt, family, and SNF. New  time for 4pm.

## 2023-11-12 NOTE — PROGRESS NOTES
Marcelino Oropeza is a 88 y.o. male on day 6 of admission presenting with Pneumonia of left lung due to infectious organism, unspecified part of lung.      Subjective     Patient is a stable, in no acute distress, no complaint, white count was elevated likely in the setting of a steroid treatment, steroids were discontinued  UA was clear  Patient is cleared for discharge    Objective     Last Recorded Vitals  /63 (BP Location: Left arm, Patient Position: Lying)   Pulse 63   Temp 36.4 °C (97.5 °F) (Temporal)   Resp 18   Wt 94 kg (207 lb 3.7 oz)   SpO2 97%   Intake/Output last 3 Shifts:    Intake/Output Summary (Last 24 hours) at 11/12/2023 1107  Last data filed at 11/12/2023 0438  Gross per 24 hour   Intake 170 ml   Output 900 ml   Net -730 ml       Admission Weight  Weight: 90.7 kg (200 lb) (11/06/23 1145)    Daily Weight  11/10/23 : 94 kg (207 lb 3.7 oz)      Physical Exam    General: Well-developed male, in no acute distress  HEENT: AT, NC, no JVD, no lymphadenopathy, neck supple  Lungs: no wheezing, no crackles  Cardiac: Normal S1-S2, no murmur, no gallop  Abdomen: Soft, nontender, no distention, positive bowel sound  Extremities: Bilateral lower extremity 1+ swelling, nonpitting   Neurological: Alert awake oriented x3, sensation intact, clear speech    Assessment/Plan     Principal Problem:    Pneumonia of left lung due to infectious organism, unspecified part of lung    Marcelino Oropeza is a 88 y.o. 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, improving   HFrEF, EF 25-30%  Hx Afib, CAD, HTN, HLD, Arthritis  Sputum cx, Strep urine/Legionella antigen, urine cx all negative  Pt was placed on cardiac monitor, Zosyn, solumedrol 40 mg BID,   ID consulted for Abx management and  worsening leukocytosis and recommended to continue with same management   Was placed on colchicine 0.6 BID, decreased to 0.6 mg daily and added Allopurinol on discharge   Nephro consulted for TOMA, stopped IVF hydration, encourage oral intake  Monitored renal function, avoided nephrotoxic agent  Continued with home meds during the hospital stay     DVT Ppx: was with Eliquis  Disposition: PT/OT recommended moderate intensity level on discharge     Within the past 24 hours pt has been hemodynamically and medically stable and ready for discharge to SNF for better recovery.  He will be continued on oral antibiotic to complete pneumonia treatment.  I will continue colchicine 0.6 mg daily and allopurinol on discharge.  He will continue on the rest of his home medication.  I also added prednisone 20 mg daily for another 7 days.  Patient will follow-up as outpatient with his primary care as needed for further management and medication adjustment.    11/12/23: Patient is a stable, in no acute distress, UA clear, IV Solu-Medrol discontinued  Patient is clinically stable for discharge today to SNF.      German Sullivan MD

## 2023-11-12 NOTE — PROGRESS NOTES
?  HISTORY OF PRESENT ILLNESS:    88 y.o. male with pneumonia and left ankle pain secondary to gout flare.  Respiratory cultures negative  Procal 0.85 from 11/7/2023    Blood culture negative  Strep and legionella negative     Feels better overall no fevers less joint pain    Current Medications:    Current Facility-Administered Medications   Medication Dose Route Frequency Provider Last Rate Last Admin    apixaban (Eliquis) tablet 2.5 mg  2.5 mg oral q12h Shaun Norwood MD   2.5 mg at 11/12/23 0858    atorvastatin (Lipitor) tablet 40 mg  40 mg oral Nightly Shaun Norwood MD   40 mg at 11/11/23 2008    benzonatate (Tessalon) capsule 100 mg  100 mg oral TID PRN German Sullivan MD   100 mg at 11/12/23 0900    cholecalciferol (Vitamin D-3) tablet 1,000 Units  1,000 Units oral Daily German Sullivan MD   1,000 Units at 11/12/23 0857    [START ON 11/13/2023] colchicine tablet 0.6 mg  0.6 mg oral Daily German Sullivan MD        empagliflozin (Jardiance) tablet 12.5 mg  12.5 mg oral Daily Shaun Norwood MD   12.5 mg at 11/12/23 0856    furosemide (Lasix) tablet 40 mg  40 mg oral Daily PRN Shaun Norwood MD        guaiFENesin (Mucinex) 12 hr tablet 600 mg  600 mg oral BID PRN Shaun Norwood MD   600 mg at 11/11/23 0933    guaiFENesin (Robitussin) 100 mg/5 mL syrup 200 mg  200 mg oral q4h PRN German Sullivan MD   200 mg at 11/12/23 0901    levothyroxine (Synthroid, Levoxyl) tablet 75 mcg  75 mcg oral Daily before breakfast Shaun Norwood MD   75 mcg at 11/12/23 0545    loperamide (Imodium) capsule 2 mg  2 mg oral 4x daily PRN German Sullivan MD   2 mg at 11/12/23 0905    [Held by provider] losartan (Cozaar) tablet 12.5 mg  12.5 mg oral Daily Shaun Norwood MD        metoprolol succinate XL (Toprol-XL) 24 hr tablet 50 mg  50 mg oral Daily Shaun Norwood MD   50 mg at 11/11/23 2008    nitroglycerin (Nitrostat) SL tablet 0.4 mg  0.4 mg sublingual q5 min PRN Shaun Norwood MD         "piperacillin-tazobactam-dextrose (Zosyn) IV 3.375 g  3.375 g intravenous q8h German Sullivan MD 12.5 mL/hr at 11/12/23 0858 3.375 g at 11/12/23 0858    And    sodium chloride 0.9 % bolus 10 mL  10 mL intravenous q8h German Sullivan MD   Stopped at 11/12/23 0958    psyllium (Metamucil) 1 packet  1 packet oral Daily Shaun Norwood MD   1 packet at 11/08/23 1139    sennosides (Senokot) tablet 17.2 mg  2 tablet oral BID Shaun Norwood MD   17.2 mg at 11/09/23 0801    [Held by provider] spironolactone (Aldactone) tablet 25 mg  25 mg oral Daily Shaun Norwood MD        traMADol (Ultram) tablet 50 mg  50 mg oral q12h PRN Shaun Norwood MD   50 mg at 11/10/23 0834        Allergies:  No Known Allergies       Review of Systems  14 system review is negative other than HPI     /65 (BP Location: Left arm, Patient Position: Lying)   Pulse 71   Temp 36.5 °C (97.7 °F) (Temporal)   Resp 18   Ht 1.702 m (5' 7\")   Wt 94 kg (207 lb 3.7 oz)   SpO2 98%   BMI 32.46 kg/m²    Physical Exam:  General: Patient appears ok at the present time. NAD  Skin: no new rashes  HEENT:  Neck is supple, No subconjunctival hemorrhages, no oral exudates  Heart: S1 S2  Lungs: diminished bases  Abdomen: soft, ND, NTTP,  Extrem: Mild edema bilaterally some mild warmth right knee           DATA:    .  Lab Results   Component Value Date    WBC 27.2 (H) 11/12/2023    HGB 12.5 (L) 11/12/2023    HCT 40.1 (L) 11/12/2023     (H) 11/12/2023     11/12/2023     Results from last 7 days   Lab Units 11/12/23  0600 11/07/23  0600 11/06/23  1113   SODIUM mmol/L 139   < > 137   POTASSIUM mmol/L 4.3   < > 4.0   CHLORIDE mmol/L 113*   < > 99   CO2 mmol/L 16*   < > 22   BUN mg/dL 45*   < > 48*   CREATININE mg/dL 1.68*   < > 2.23*   CALCIUM mg/dL 8.6   < > 9.9   PROTEIN TOTAL g/dL  --   --  8.3*   BILIRUBIN TOTAL mg/dL  --   --  2.0*   ALK PHOS U/L  --   --  90   ALT U/L  --   --  23   AST U/L  --   --  29   GLUCOSE mg/dL 142*   < > 122*    < > " = values in this interval not displayed.   Susceptibility data from last 90 days.  Collected Specimen Info Organism   11/07/23 Fluid from SPUTUM Normal throat nain   Susceptibility data from last 90 days.  Collected Specimen Info Organism   11/07/23 Fluid from SPUTUM Normal throat nain           IMPRESSION:        Problem List Items Addressed This Visit          Pulmonary and Pneumonias    * (Principal) Pneumonia of left lung due to infectious organism, unspecified part of lung - Primary    Relevant Medications    benzonatate (Tessalon) 100 mg capsule    colchicine 0.6 mg tablet    guaiFENesin (Mucinex) 600 mg 12 hr tablet    guaiFENesin (Robitussin) 100 mg/5 mL syrup    levoFLOXacin (Levaquin) 500 mg tablet    loperamide (Imodium) 2 mg capsule    Other Relevant Orders    Basic metabolic panel    CBC     Other Visit Diagnoses       Acute UTI        Relevant Medications    loperamide (Imodium) 2 mg capsule    Other Relevant Orders    Basic metabolic panel    CBC    Acute renal failure superimposed on chronic kidney disease, unspecified acute renal failure type, unspecified CKD stage (CMS/HCC)        Acute gout due to renal impairment involving left ankle        Relevant Medications    allopurinol (Zyloprim) 100 mg tablet           PLAN:   respiratory status seems stabilized continue same antibiotic therapy     Trend WBCs but this is in the presence of steroids and presumably gout flareup       Pillo Tejeda MD

## 2023-11-12 NOTE — PROGRESS NOTES
Nephrology Progress Note    Assessment:  88 y.o. male with history s/f CAD s/p CABG, HTN, combined CHF (LVEF 25-30%), A.fib, gout, HLD and CKD stage III who presented for LT leg pain for a few days.     TOMA on CKD stage III: possibly due to volume depletion in setting of lasix, lisinopril and aldactone use, also ? If took NSAIDs (states sister gave him 400 mg of pain medications), pt takes jardiance, baseline Scr ~1.5 in 2022, p/w Scr 2.2, CKD risk factors CAD, ?SAGRARIO (LT kidney 10.2, RT 7.9 cm), HTN, ICM, UA w/ ketones, prot, blood and glucose   LT sided PnA  Acute gout flare: uric acid 11, on lasix, losartan  Metabolic acidosis: improving   Anemia      Plan:  - function stable, no changes  - ok to discharge from renal standpoint once medically cleared, should follow up in 3 weeks       Subjective:  Admit Date: 11/6/2023    Interval History: function stable, CO2 trending down     Medications:  Scheduled Meds:apixaban, 2.5 mg, oral, q12h  atorvastatin, 40 mg, oral, Nightly  cholecalciferol, 1,000 Units, oral, Daily  [START ON 11/13/2023] colchicine, 0.6 mg, oral, Daily  empagliflozin, 12.5 mg, oral, Daily  levothyroxine, 75 mcg, oral, Daily before breakfast  [Held by provider] losartan, 12.5 mg, oral, Daily  metoprolol succinate XL, 50 mg, oral, Daily  piperacillin-tazobactam, 3.375 g, intravenous, q8h   And  sodium chloride, 10 mL, intravenous, q8h  psyllium, 1 packet, oral, Daily  sennosides, 2 tablet, oral, BID  [Held by provider] spironolactone, 25 mg, oral, Daily      Continuous Infusions:       CBC:   Lab Results   Component Value Date    WBC 27.2 (H) 11/12/2023    RBC 3.90 (L) 11/12/2023    HGB 12.5 (L) 11/12/2023    HCT 40.1 (L) 11/12/2023     (H) 11/12/2023    MCH 32.1 11/12/2023    MCHC 31.2 (L) 11/12/2023    RDW 13.4 11/12/2023     11/12/2023     BMP:    Lab Results   Component Value Date     11/12/2023    K 4.3 11/12/2023     (H) 11/12/2023    CO2 16 (L) 11/12/2023    BUN 45 (H)  "11/12/2023    CREATININE 1.68 (H) 11/12/2023    CALCIUM 8.6 11/12/2023    GLUCOSE 142 (H) 11/12/2023       Objective:  Vitals: /65 (BP Location: Left arm, Patient Position: Lying)   Pulse 71   Temp 36.5 °C (97.7 °F) (Temporal)   Resp 18   Ht 1.702 m (5' 7\")   Wt 94 kg (207 lb 3.7 oz)   SpO2 98%   BMI 32.46 kg/m²    Wt Readings from Last 3 Encounters:   11/10/23 94 kg (207 lb 3.7 oz)      24HR INTAKE/OUTPUT:    Intake/Output Summary (Last 24 hours) at 11/12/2023 1505  Last data filed at 11/12/2023 0438  Gross per 24 hour   Intake 170 ml   Output 900 ml   Net -730 ml         General: alert, in no apparent distress  HEENT: normocephalic, atraumatic, anicteric  Lungs: non-labored respirations, clear to auscultation bilaterally  Heart: regular rate and rhythm, no murmurs or rubs  Abdomen: soft, non-tender, non-distended  Ext: no cyanosis, no peripheral edema  Neuro: alert and oriented, no gross abnormalities      Electronically signed by Pooja Carlos MD, MD              "

## 2023-11-21 ENCOUNTER — NURSING HOME VISIT (OUTPATIENT)
Dept: POST ACUTE CARE | Facility: EXTERNAL LOCATION | Age: 88
End: 2023-11-21
Payer: MEDICARE

## 2023-11-21 DIAGNOSIS — I50.9 CONGESTIVE HEART FAILURE, UNSPECIFIED HF CHRONICITY, UNSPECIFIED HEART FAILURE TYPE (MULTI): ICD-10-CM

## 2023-11-21 DIAGNOSIS — I25.118 CORONARY ARTERY DISEASE INVOLVING NATIVE HEART WITH OTHER FORM OF ANGINA PECTORIS, UNSPECIFIED VESSEL OR LESION TYPE (CMS-HCC): ICD-10-CM

## 2023-11-21 DIAGNOSIS — I48.91 ATRIAL FIBRILLATION, UNSPECIFIED TYPE (MULTI): ICD-10-CM

## 2023-11-21 DIAGNOSIS — J18.9 PNEUMONIA DUE TO INFECTIOUS ORGANISM, UNSPECIFIED LATERALITY, UNSPECIFIED PART OF LUNG: Primary | ICD-10-CM

## 2023-11-21 DIAGNOSIS — I11.9 CARDIOMYOPATHY, HYPERTENSIVE, BENIGN, WITHOUT HEART FAILURE (MULTI): ICD-10-CM

## 2023-11-21 DIAGNOSIS — I43 CARDIOMYOPATHY, HYPERTENSIVE, BENIGN, WITHOUT HEART FAILURE (MULTI): ICD-10-CM

## 2023-11-21 DIAGNOSIS — M1A.9XX0 CHRONIC GOUT WITHOUT TOPHUS, UNSPECIFIED CAUSE, UNSPECIFIED SITE: ICD-10-CM

## 2023-11-21 PROBLEM — I25.10 CORONARY ARTERY DISEASE INVOLVING NATIVE HEART: Status: ACTIVE | Noted: 2023-11-21

## 2023-11-21 PROCEDURE — 99306 1ST NF CARE HIGH MDM 50: CPT | Performed by: EMERGENCY MEDICINE

## 2023-11-21 PROCEDURE — 99497 ADVNCD CARE PLAN 30 MIN: CPT | Performed by: EMERGENCY MEDICINE

## 2023-11-21 NOTE — LETTER
Patient: Marcelino Oropeza  : 1935    Encounter Date: 2023    Marcelino Oropeza   88 y.o.  male  @location@            Assessment and Plan:  History and physical  Diagnosis  Pneumonia of left lung due to infectious organism, unspecified part of lung    Marcelino Oropeza is a 88 y.o. 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, improving   HFrEF, EF 25-30%  Hx Afib, CAD, HTN, HLD, Arthritis  Sputum cx, Strep urine/Legionella antigen, urine cx all negative  Pt was placed on cardiac monitor, Zosyn, solumedrol 40 mg BID,   ID consulted for Abx management and worsening leukocytosis and recommended to continue with same management   Was placed on colchicine 0.6 BID, decreased to 0.6 mg daily and added Allopurinol on discharge   Nephro consulted for TOMA, stopped IVF hydration, encourage oral intake  Monitored renal function, avoided nephrotoxic agent  Continued with home meds during the hospital stay     DVT Ppx: was with Eliquis  Disposition: PT/OT recommended moderate intensity level on discharge     Within the past 24 hours pt has been hemodynamically and medically stable and ready for discharge to SNF for better recovery.  He will be continued on oral antibiotic to complete pneumonia treatment.  I will continue colchicine 0.6 mg daily and allopurinol on discharge.  He will continue on the rest of his home medication.  I also added prednisone 20 mg daily for another 7 days.  Patient will follow-up as outpatient with his primary care as needed for further management and medication adjustment.    I discussed advanced care planning including the explanation and discussion of advanced directives. If patient does not have current up to date documents, examples and information provided on  how to create both living will and power of . Patient was encouraged to work on completing these documents.  Information and advise was also provided on DO NOT RESUSCITATE and patient encouraged to consider this  Patient is not sure about DNR at this time.  CODE STATUS is on file with the facility    Source of history: Nurse, Medical personnel, Medical record, Patient.  History limitation: None.    HPI:  History and physical    Patient is unable to give any detailed history and therefore history is obtained from the chart  No acute complaints voiced by the patient or acute concerns raised by nursing    History of hospitalization-  Chief Complaint: Leg pain     History Of Present Illness:    Marcelino Oropeza is a 88 y.o. male with a significant past medical history of including gout, HFrEF, and A-fib who presents to hospital due to concerns of leg pain.  Patient is a poor historian however reports that he began to experience pain in his lower extremities for the past few days.  Localizes most to his left ankle.  Notes that it is tender with touch and movement.  Reportedly unsteady on his feet, states he felt like his right ankle was about to give out.  Does have a history of gout, unsure if this is like prior flares in the past.  Does report intake of 1-2 vodka drinks at night in recent history.  Denying any fevers chills or chest pain.  Is endorsing cough with discolored sputum production.  States has been ongoing also for the past few days.  Denies difficulty breathing.  Endorsing constipation but denies any abdominal pain or diarrhea.  Further ROS is unremarkable..     Past Medical History:  - CAD, Ischemic cardiomyopathy, HTN, Systolic heart failure, A-fib, Hyperlipidemia, Gout, arthritis      Past Surgical History:  -CABG x4,     Family History:  -Denies family history including hypertension/diabetes     Social History:  Tobacco: Quit 60 years ago  Alcohol: Drinks 1-2 vodka drinks a night  Other Drugs:  Denies use  Home Situation: Lives by himself.  Ambulates with cane.     Current Outpatient Medications   Medication Sig Dispense Refill   • allopurinol (Zyloprim) 100 mg tablet Take 1 tablet (100 mg) by mouth once daily. 60 tablet 3   • apixaban (Eliquis) 2.5 mg tablet Take 1 tablet (2.5 mg) by mouth every 12 hours.     • atorvastatin (Lipitor) 40 mg tablet Take 1 tablet (40 mg) by mouth once daily at bedtime.     • benzonatate (Tessalon) 100 mg capsule Take 1 capsule (100 mg) by mouth 3 times a day as needed for cough. Do not crush or chew. 20 capsule 0   • cholecalciferol (Vitamin D-3) 25 MCG (1000 UT) tablet Take 1 tablet (25 mcg) by mouth once daily.     • colchicine 0.6 mg tablet Take 1 tablet (0.6 mg) by mouth once daily. 30 tablet 0   • empagliflozin (Jardiance) 25 mg Take 0.5 tablets (12.5 mg) by mouth once daily.     • furosemide (Lasix) 40 mg tablet Take 1 tablet (40 mg) by mouth once daily as needed (Fluid Retention).     • guaiFENesin (Mucinex) 600 mg 12 hr tablet Take 1 tablet (600 mg) by mouth 2 times a day as needed for cough for up to 10 days. Do not crush, chew, or split. 20 tablet 0   • guaiFENesin (Robitussin) 100 mg/5 mL syrup Take 10 mL (200 mg) by mouth every 4 hours if needed for congestion. 120 mL 0   • levothyroxine (Synthroid, Levoxyl) 75 mcg tablet Take 1 tablet (75 mcg) by mouth once daily in the morning. Take before meals.     • loperamide (Imodium) 2 mg capsule Take 1 capsule (2 mg) by mouth 4 times a day as needed for diarrhea. 30 capsule 0   • losartan (Cozaar) 25 mg tablet Take 0.5 tablets (12.5 mg) by mouth once daily.     • metoprolol succinate XL (Toprol-XL) 50 mg 24 hr tablet Take 1 tablet (50 mg) by mouth once daily.     • nitroglycerin (Nitrostat) 0.4 mg SL tablet Place 1 tablet (0.4 mg) under the tongue every 5 minutes if needed for chest pain (up to 3 doses).     • spironolactone (Aldactone) 25 mg tablet Take 1 tablet (25 mg) by mouth once daily.       No current  facility-administered medications for this visit.       Physical Exam:  Vital signs as per nursing/MA documentation were reviewed  General appearance: Alert and in no acute distress  HEENT: Normal Inspection  Neck - Normal Inspection  Respiratory : No respiratory distress. Lungs are clear   Cardiovascular: heart rate normal. No gallop  Back - normal inspection  Skin inspection:Warm  Musculoskeletal : No deformities  Neuro : Limited exam. Baseline    ROS: Review of symptoms is negative except for what is mentioned in HPI    Results/Data  Records including but not limited to electronic medical records, relevant lab work and imaging from patient's health care facility encounter were reviewed and independently verified      Charting was completed using voice recognition technology and may include unintended errors.    Discussed with patient/family, RN, and NP.      Electronically Signed By: Adam Bustamante MD   11/21/23  2:32 PM

## 2023-11-21 NOTE — PROGRESS NOTES
Marcelino Oropeza   88 y.o.  male  @location@            Assessment and Plan:  History and physical  Diagnosis  Pneumonia of left lung due to infectious organism, unspecified part of lung    Marcelino Oropeza is a 88 y.o. 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, improving   HFrEF, EF 25-30%  Hx Afib, CAD, HTN, HLD, Arthritis  Sputum cx, Strep urine/Legionella antigen, urine cx all negative  Pt was placed on cardiac monitor, Zosyn, solumedrol 40 mg BID,   ID consulted for Abx management and worsening leukocytosis and recommended to continue with same management   Was placed on colchicine 0.6 BID, decreased to 0.6 mg daily and added Allopurinol on discharge   Nephro consulted for TOMA, stopped IVF hydration, encourage oral intake  Monitored renal function, avoided nephrotoxic agent  Continued with home meds during the hospital stay     DVT Ppx: was with Eliquis  Disposition: PT/OT recommended moderate intensity level on discharge     Within the past 24 hours pt has been hemodynamically and medically stable and ready for discharge to SNF for better recovery.  He will be continued on oral antibiotic to complete pneumonia treatment.  I will continue colchicine 0.6 mg daily and allopurinol on discharge.  He will continue on the rest of his home medication.  I also added prednisone 20 mg daily for another 7 days.  Patient will follow-up as outpatient with his primary care as needed for further management and medication adjustment.    I discussed advanced care planning including the explanation and discussion of advanced directives. If patient does not have current up to date documents, examples and information provided on how to create both living will and power of . Patient was  encouraged to work on completing these documents.  Information and advise was also provided on DO NOT RESUSCITATE and patient encouraged to consider this  Patient is not sure about DNR at this time.  CODE STATUS is on file with the facility    Source of history: Nurse, Medical personnel, Medical record, Patient.  History limitation: None.    HPI:  History and physical    Patient is unable to give any detailed history and therefore history is obtained from the chart  No acute complaints voiced by the patient or acute concerns raised by nursing    History of hospitalization-  Chief Complaint: Leg pain     History Of Present Illness:    Marcelino Oropeza is a 88 y.o. male with a significant past medical history of including gout, HFrEF, and A-fib who presents to hospital due to concerns of leg pain.  Patient is a poor historian however reports that he began to experience pain in his lower extremities for the past few days.  Localizes most to his left ankle.  Notes that it is tender with touch and movement.  Reportedly unsteady on his feet, states he felt like his right ankle was about to give out.  Does have a history of gout, unsure if this is like prior flares in the past.  Does report intake of 1-2 vodka drinks at night in recent history.  Denying any fevers chills or chest pain.  Is endorsing cough with discolored sputum production.  States has been ongoing also for the past few days.  Denies difficulty breathing.  Endorsing constipation but denies any abdominal pain or diarrhea.  Further ROS is unremarkable..     Past Medical History:  - CAD, Ischemic cardiomyopathy, HTN, Systolic heart failure, A-fib, Hyperlipidemia, Gout, arthritis      Past Surgical History:  -CABG x4,     Family History:  -Denies family history including hypertension/diabetes     Social History:  Tobacco: Quit 60 years ago  Alcohol: Drinks 1-2 vodka drinks a night  Other Drugs: Denies use  Home Situation: Lives by himself.  Ambulates with cane.      Current Outpatient Medications   Medication Sig Dispense Refill    allopurinol (Zyloprim) 100 mg tablet Take 1 tablet (100 mg) by mouth once daily. 60 tablet 3    apixaban (Eliquis) 2.5 mg tablet Take 1 tablet (2.5 mg) by mouth every 12 hours.      atorvastatin (Lipitor) 40 mg tablet Take 1 tablet (40 mg) by mouth once daily at bedtime.      benzonatate (Tessalon) 100 mg capsule Take 1 capsule (100 mg) by mouth 3 times a day as needed for cough. Do not crush or chew. 20 capsule 0    cholecalciferol (Vitamin D-3) 25 MCG (1000 UT) tablet Take 1 tablet (25 mcg) by mouth once daily.      colchicine 0.6 mg tablet Take 1 tablet (0.6 mg) by mouth once daily. 30 tablet 0    empagliflozin (Jardiance) 25 mg Take 0.5 tablets (12.5 mg) by mouth once daily.      furosemide (Lasix) 40 mg tablet Take 1 tablet (40 mg) by mouth once daily as needed (Fluid Retention).      guaiFENesin (Mucinex) 600 mg 12 hr tablet Take 1 tablet (600 mg) by mouth 2 times a day as needed for cough for up to 10 days. Do not crush, chew, or split. 20 tablet 0    guaiFENesin (Robitussin) 100 mg/5 mL syrup Take 10 mL (200 mg) by mouth every 4 hours if needed for congestion. 120 mL 0    levothyroxine (Synthroid, Levoxyl) 75 mcg tablet Take 1 tablet (75 mcg) by mouth once daily in the morning. Take before meals.      loperamide (Imodium) 2 mg capsule Take 1 capsule (2 mg) by mouth 4 times a day as needed for diarrhea. 30 capsule 0    losartan (Cozaar) 25 mg tablet Take 0.5 tablets (12.5 mg) by mouth once daily.      metoprolol succinate XL (Toprol-XL) 50 mg 24 hr tablet Take 1 tablet (50 mg) by mouth once daily.      nitroglycerin (Nitrostat) 0.4 mg SL tablet Place 1 tablet (0.4 mg) under the tongue every 5 minutes if needed for chest pain (up to 3 doses).      spironolactone (Aldactone) 25 mg tablet Take 1 tablet (25 mg) by mouth once daily.       No current facility-administered medications for this visit.       Physical Exam:  Vital signs as per  nursing/MA documentation were reviewed  General appearance: Alert and in no acute distress  HEENT: Normal Inspection  Neck - Normal Inspection  Respiratory : No respiratory distress. Lungs are clear   Cardiovascular: heart rate normal. No gallop  Back - normal inspection  Skin inspection:Warm  Musculoskeletal : No deformities  Neuro : Limited exam. Baseline    ROS: Review of symptoms is negative except for what is mentioned in HPI    Results/Data  Records including but not limited to electronic medical records, relevant lab work and imaging from patient's health care facility encounter were reviewed and independently verified      Charting was completed using voice recognition technology and may include unintended errors.    Discussed with patient/family, RN, and NP.